# Patient Record
Sex: MALE | Race: WHITE | NOT HISPANIC OR LATINO | Employment: OTHER | URBAN - METROPOLITAN AREA
[De-identification: names, ages, dates, MRNs, and addresses within clinical notes are randomized per-mention and may not be internally consistent; named-entity substitution may affect disease eponyms.]

---

## 2022-10-11 ENCOUNTER — HOSPITAL ENCOUNTER (OUTPATIENT)
Facility: HOSPITAL | Age: 82
Setting detail: OBSERVATION
LOS: 1 days | Discharge: HOME HEALTH | End: 2022-10-12
Attending: EMERGENCY MEDICINE | Admitting: STUDENT IN AN ORGANIZED HEALTH CARE EDUCATION/TRAINING PROGRAM

## 2022-10-11 ENCOUNTER — APPOINTMENT (OUTPATIENT)
Dept: GENERAL RADIOLOGY | Facility: HOSPITAL | Age: 82
End: 2022-10-11

## 2022-10-11 ENCOUNTER — APPOINTMENT (OUTPATIENT)
Dept: CARDIOLOGY | Facility: HOSPITAL | Age: 82
End: 2022-10-11

## 2022-10-11 DIAGNOSIS — Z74.09 MOBILITY IMPAIRED: ICD-10-CM

## 2022-10-11 DIAGNOSIS — E16.2 HYPOGLYCEMIA: ICD-10-CM

## 2022-10-11 DIAGNOSIS — E08.69 DIABETES DUE TO UNDERLYING CONDITION W OTH COMPLICATION: ICD-10-CM

## 2022-10-11 DIAGNOSIS — R06.02 SHORTNESS OF BREATH: Primary | ICD-10-CM

## 2022-10-11 DIAGNOSIS — R77.8 ELEVATED TROPONIN: ICD-10-CM

## 2022-10-11 PROBLEM — N18.30 CKD (CHRONIC KIDNEY DISEASE) STAGE 3, GFR 30-59 ML/MIN: Status: ACTIVE | Noted: 2022-10-11

## 2022-10-11 PROBLEM — D69.6 THROMBOCYTOPENIA: Status: ACTIVE | Noted: 2022-10-11

## 2022-10-11 PROBLEM — D63.8 ANEMIA, CHRONIC DISEASE: Status: ACTIVE | Noted: 2022-10-11

## 2022-10-11 PROBLEM — N17.9 ACUTE KIDNEY INJURY: Status: ACTIVE | Noted: 2022-10-11

## 2022-10-11 LAB
ALBUMIN SERPL-MCNC: 4.2 G/DL (ref 3.5–5.2)
ALBUMIN/GLOB SERPL: 2.1 G/DL
ALP SERPL-CCNC: 98 U/L (ref 39–117)
ALT SERPL W P-5'-P-CCNC: 14 U/L (ref 1–41)
ANION GAP SERPL CALCULATED.3IONS-SCNC: 11.2 MMOL/L (ref 5–15)
ANION GAP SERPL CALCULATED.3IONS-SCNC: 7.7 MMOL/L (ref 5–15)
APTT PPP: 27.6 SECONDS (ref 22.7–35.4)
ASCENDING AORTA: 3.2 CM
AST SERPL-CCNC: 21 U/L (ref 1–40)
BASOPHILS # BLD AUTO: 0.04 10*3/MM3 (ref 0–0.2)
BASOPHILS NFR BLD AUTO: 0.5 % (ref 0–1.5)
BH CV ECHO MEAS - ACS: 1.92 CM
BH CV ECHO MEAS - AO MAX PG: 7.6 MMHG
BH CV ECHO MEAS - AO MEAN PG: 4.7 MMHG
BH CV ECHO MEAS - AO ROOT DIAM: 3.6 CM
BH CV ECHO MEAS - AO V2 MAX: 137.7 CM/SEC
BH CV ECHO MEAS - AO V2 VTI: 33 CM
BH CV ECHO MEAS - AVA(I,D): 2.43 CM2
BH CV ECHO MEAS - EDV(CUBED): 62.6 ML
BH CV ECHO MEAS - EDV(MOD-SP2): 74 ML
BH CV ECHO MEAS - EDV(MOD-SP4): 168 ML
BH CV ECHO MEAS - EF(MOD-BP): 55.3 %
BH CV ECHO MEAS - EF(MOD-SP2): 48.6 %
BH CV ECHO MEAS - EF(MOD-SP4): 58.9 %
BH CV ECHO MEAS - ESV(CUBED): 20.5 ML
BH CV ECHO MEAS - ESV(MOD-SP2): 38 ML
BH CV ECHO MEAS - ESV(MOD-SP4): 69 ML
BH CV ECHO MEAS - FS: 31.1 %
BH CV ECHO MEAS - IVS/LVPW: 0.93 CM
BH CV ECHO MEAS - IVSD: 1.07 CM
BH CV ECHO MEAS - LAT PEAK E' VEL: 9 CM/SEC
BH CV ECHO MEAS - LV DIASTOLIC VOL/BSA (35-75): 69 CM2
BH CV ECHO MEAS - LV MASS(C)D: 144.5 GRAMS
BH CV ECHO MEAS - LV MAX PG: 8.3 MMHG
BH CV ECHO MEAS - LV MEAN PG: 3.9 MMHG
BH CV ECHO MEAS - LV SYSTOLIC VOL/BSA (12-30): 28.3 CM2
BH CV ECHO MEAS - LV V1 MAX: 143.8 CM/SEC
BH CV ECHO MEAS - LV V1 VTI: 26.5 CM
BH CV ECHO MEAS - LVIDD: 4 CM
BH CV ECHO MEAS - LVIDS: 2.7 CM
BH CV ECHO MEAS - LVOT AREA: 3 CM2
BH CV ECHO MEAS - LVOT DIAM: 1.96 CM
BH CV ECHO MEAS - LVPWD: 1.14 CM
BH CV ECHO MEAS - MED PEAK E' VEL: 7.1 CM/SEC
BH CV ECHO MEAS - MV A DUR: 0.12 SEC
BH CV ECHO MEAS - MV A MAX VEL: 81.4 CM/SEC
BH CV ECHO MEAS - MV DEC SLOPE: 462.6 CM/SEC2
BH CV ECHO MEAS - MV DEC TIME: 0.18 MSEC
BH CV ECHO MEAS - MV E MAX VEL: 104 CM/SEC
BH CV ECHO MEAS - MV E/A: 1.28
BH CV ECHO MEAS - MV MAX PG: 5.7 MMHG
BH CV ECHO MEAS - MV MEAN PG: 2.6 MMHG
BH CV ECHO MEAS - MV P1/2T: 76.1 MSEC
BH CV ECHO MEAS - MV V2 VTI: 28.6 CM
BH CV ECHO MEAS - MVA(P1/2T): 2.9 CM2
BH CV ECHO MEAS - MVA(VTI): 2.8 CM2
BH CV ECHO MEAS - PA ACC TIME: 0.17 SEC
BH CV ECHO MEAS - PA PR(ACCEL): 2.9 MMHG
BH CV ECHO MEAS - PA V2 MAX: 131.7 CM/SEC
BH CV ECHO MEAS - PULM A REVS DUR: 0.09 SEC
BH CV ECHO MEAS - PULM A REVS VEL: 15.3 CM/SEC
BH CV ECHO MEAS - PULM DIAS VEL: 54 CM/SEC
BH CV ECHO MEAS - PULM S/D: 0.93
BH CV ECHO MEAS - PULM SYS VEL: 50.1 CM/SEC
BH CV ECHO MEAS - RAP SYSTOLE: 8 MMHG
BH CV ECHO MEAS - RV MAX PG: 8.3 MMHG
BH CV ECHO MEAS - RV V1 MAX: 143.8 CM/SEC
BH CV ECHO MEAS - RV V1 VTI: 26.6 CM
BH CV ECHO MEAS - RVSP: 34 MMHG
BH CV ECHO MEAS - SI(MOD-SP2): 14.8 ML/M2
BH CV ECHO MEAS - SI(MOD-SP4): 40.6 ML/M2
BH CV ECHO MEAS - SV(LVOT): 80.2 ML
BH CV ECHO MEAS - SV(MOD-SP2): 36 ML
BH CV ECHO MEAS - SV(MOD-SP4): 99 ML
BH CV ECHO MEAS - TAPSE (>1.6): 2.28 CM
BH CV ECHO MEAS - TR MAX PG: 26.2 MMHG
BH CV ECHO MEAS - TR MAX VEL: 255.8 CM/SEC
BH CV ECHO MEASUREMENTS AVERAGE E/E' RATIO: 12.92
BH CV XLRA - RV BASE: 4.6 CM
BH CV XLRA - RV LENGTH: 7.6 CM
BH CV XLRA - RV MID: 4.1 CM
BH CV XLRA - TDI S': 12.6 CM/SEC
BILIRUB SERPL-MCNC: 0.5 MG/DL (ref 0–1.2)
BUN SERPL-MCNC: 17 MG/DL (ref 8–23)
BUN SERPL-MCNC: 18 MG/DL (ref 8–23)
BUN/CREAT SERPL: 10.5 (ref 7–25)
BUN/CREAT SERPL: 11 (ref 7–25)
CALCIUM SPEC-SCNC: 8.9 MG/DL (ref 8.6–10.5)
CALCIUM SPEC-SCNC: 9.3 MG/DL (ref 8.6–10.5)
CHLORIDE SERPL-SCNC: 103 MMOL/L (ref 98–107)
CHLORIDE SERPL-SCNC: 104 MMOL/L (ref 98–107)
CO2 SERPL-SCNC: 24.8 MMOL/L (ref 22–29)
CO2 SERPL-SCNC: 30.3 MMOL/L (ref 22–29)
CREAT SERPL-MCNC: 1.62 MG/DL (ref 0.76–1.27)
CREAT SERPL-MCNC: 1.64 MG/DL (ref 0.76–1.27)
DEPRECATED RDW RBC AUTO: 44.5 FL (ref 37–54)
DEPRECATED RDW RBC AUTO: 47.6 FL (ref 37–54)
EGFRCR SERPLBLD CKD-EPI 2021: 41.2 ML/MIN/1.73
EGFRCR SERPLBLD CKD-EPI 2021: 41.9 ML/MIN/1.73
EOSINOPHIL # BLD AUTO: 0.13 10*3/MM3 (ref 0–0.4)
EOSINOPHIL NFR BLD AUTO: 1.5 % (ref 0.3–6.2)
ERYTHROCYTE [DISTWIDTH] IN BLOOD BY AUTOMATED COUNT: 13.5 % (ref 12.3–15.4)
ERYTHROCYTE [DISTWIDTH] IN BLOOD BY AUTOMATED COUNT: 13.8 % (ref 12.3–15.4)
GLOBULIN UR ELPH-MCNC: 2 GM/DL
GLUCOSE BLDC GLUCOMTR-MCNC: 129 MG/DL (ref 70–130)
GLUCOSE BLDC GLUCOMTR-MCNC: 148 MG/DL (ref 70–130)
GLUCOSE BLDC GLUCOMTR-MCNC: 174 MG/DL (ref 70–130)
GLUCOSE BLDC GLUCOMTR-MCNC: 191 MG/DL (ref 70–130)
GLUCOSE BLDC GLUCOMTR-MCNC: 200 MG/DL (ref 70–130)
GLUCOSE SERPL-MCNC: 155 MG/DL (ref 65–99)
GLUCOSE SERPL-MCNC: 44 MG/DL (ref 65–99)
HBA1C MFR BLD: 7.4 % (ref 4.8–5.6)
HCT VFR BLD AUTO: 35.8 % (ref 37.5–51)
HCT VFR BLD AUTO: 36.4 % (ref 37.5–51)
HGB BLD-MCNC: 11.8 G/DL (ref 13–17.7)
HGB BLD-MCNC: 12.3 G/DL (ref 13–17.7)
INR PPP: 1.09 (ref 0.9–1.1)
LEFT ATRIUM VOLUME INDEX: 26.2 ML/M2
LYMPHOCYTES # BLD AUTO: 1.27 10*3/MM3 (ref 0.7–3.1)
LYMPHOCYTES NFR BLD AUTO: 14.6 % (ref 19.6–45.3)
MAXIMAL PREDICTED HEART RATE: 137 BPM
MCH RBC QN AUTO: 30.3 PG (ref 26.6–33)
MCH RBC QN AUTO: 31 PG (ref 26.6–33)
MCHC RBC AUTO-ENTMCNC: 32.4 G/DL (ref 31.5–35.7)
MCHC RBC AUTO-ENTMCNC: 34.4 G/DL (ref 31.5–35.7)
MCV RBC AUTO: 90.2 FL (ref 79–97)
MCV RBC AUTO: 93.6 FL (ref 79–97)
MONOCYTES # BLD AUTO: 0.89 10*3/MM3 (ref 0.1–0.9)
MONOCYTES NFR BLD AUTO: 10.2 % (ref 5–12)
NEUTROPHILS NFR BLD AUTO: 6.31 10*3/MM3 (ref 1.7–7)
NEUTROPHILS NFR BLD AUTO: 72.6 % (ref 42.7–76)
NT-PROBNP SERPL-MCNC: 953 PG/ML (ref 0–1800)
PLATELET # BLD AUTO: 123 10*3/MM3 (ref 140–450)
PLATELET # BLD AUTO: 138 10*3/MM3 (ref 140–450)
PMV BLD AUTO: 10.9 FL (ref 6–12)
PMV BLD AUTO: 11.7 FL (ref 6–12)
POTASSIUM SERPL-SCNC: 3.8 MMOL/L (ref 3.5–5.2)
POTASSIUM SERPL-SCNC: 3.8 MMOL/L (ref 3.5–5.2)
PROT SERPL-MCNC: 6.2 G/DL (ref 6–8.5)
PROTHROMBIN TIME: 14.2 SECONDS (ref 11.7–14.2)
QT INTERVAL: 468 MS
RBC # BLD AUTO: 3.89 10*6/MM3 (ref 4.14–5.8)
RBC # BLD AUTO: 3.97 10*6/MM3 (ref 4.14–5.8)
SINUS: 3.3 CM
SODIUM SERPL-SCNC: 140 MMOL/L (ref 136–145)
SODIUM SERPL-SCNC: 141 MMOL/L (ref 136–145)
STJ: 3 CM
STRESS TARGET HR: 116 BPM
TROPONIN T SERPL-MCNC: 0.04 NG/ML (ref 0–0.03)
TROPONIN T SERPL-MCNC: 0.04 NG/ML (ref 0–0.03)
TROPONIN T SERPL-MCNC: 0.05 NG/ML (ref 0–0.03)
WBC NRBC COR # BLD: 5.19 10*3/MM3 (ref 3.4–10.8)
WBC NRBC COR # BLD: 8.69 10*3/MM3 (ref 3.4–10.8)

## 2022-10-11 PROCEDURE — 85025 COMPLETE CBC W/AUTO DIFF WBC: CPT | Performed by: EMERGENCY MEDICINE

## 2022-10-11 PROCEDURE — 85610 PROTHROMBIN TIME: CPT | Performed by: EMERGENCY MEDICINE

## 2022-10-11 PROCEDURE — G0378 HOSPITAL OBSERVATION PER HR: HCPCS

## 2022-10-11 PROCEDURE — 63710000001 INSULIN LISPRO (HUMAN) PER 5 UNITS: Performed by: NURSE PRACTITIONER

## 2022-10-11 PROCEDURE — 25010000002 PERFLUTREN (DEFINITY) 8.476 MG IN SODIUM CHLORIDE (PF) 0.9 % 10 ML INJECTION: Performed by: INTERNAL MEDICINE

## 2022-10-11 PROCEDURE — 97166 OT EVAL MOD COMPLEX 45 MIN: CPT

## 2022-10-11 PROCEDURE — 84484 ASSAY OF TROPONIN QUANT: CPT | Performed by: NURSE PRACTITIONER

## 2022-10-11 PROCEDURE — 97530 THERAPEUTIC ACTIVITIES: CPT

## 2022-10-11 PROCEDURE — 83880 ASSAY OF NATRIURETIC PEPTIDE: CPT | Performed by: EMERGENCY MEDICINE

## 2022-10-11 PROCEDURE — 99285 EMERGENCY DEPT VISIT HI MDM: CPT

## 2022-10-11 PROCEDURE — 80053 COMPREHEN METABOLIC PANEL: CPT | Performed by: EMERGENCY MEDICINE

## 2022-10-11 PROCEDURE — 99204 OFFICE O/P NEW MOD 45 MIN: CPT | Performed by: INTERNAL MEDICINE

## 2022-10-11 PROCEDURE — 85730 THROMBOPLASTIN TIME PARTIAL: CPT | Performed by: EMERGENCY MEDICINE

## 2022-10-11 PROCEDURE — 84484 ASSAY OF TROPONIN QUANT: CPT | Performed by: EMERGENCY MEDICINE

## 2022-10-11 PROCEDURE — 83036 HEMOGLOBIN GLYCOSYLATED A1C: CPT | Performed by: NURSE PRACTITIONER

## 2022-10-11 PROCEDURE — 93005 ELECTROCARDIOGRAM TRACING: CPT | Performed by: EMERGENCY MEDICINE

## 2022-10-11 PROCEDURE — 93306 TTE W/DOPPLER COMPLETE: CPT

## 2022-10-11 PROCEDURE — 93306 TTE W/DOPPLER COMPLETE: CPT | Performed by: INTERNAL MEDICINE

## 2022-10-11 PROCEDURE — 85027 COMPLETE CBC AUTOMATED: CPT | Performed by: NURSE PRACTITIONER

## 2022-10-11 PROCEDURE — 71045 X-RAY EXAM CHEST 1 VIEW: CPT

## 2022-10-11 PROCEDURE — 82962 GLUCOSE BLOOD TEST: CPT

## 2022-10-11 PROCEDURE — 97162 PT EVAL MOD COMPLEX 30 MIN: CPT

## 2022-10-11 RX ORDER — ACETAMINOPHEN 160 MG/5ML
650 SOLUTION ORAL EVERY 4 HOURS PRN
Status: DISCONTINUED | OUTPATIENT
Start: 2022-10-11 | End: 2022-10-12 | Stop reason: HOSPADM

## 2022-10-11 RX ORDER — POTASSIUM CHLORIDE 750 MG/1
40 TABLET, FILM COATED, EXTENDED RELEASE ORAL AS NEEDED
Status: DISCONTINUED | OUTPATIENT
Start: 2022-10-11 | End: 2022-10-12 | Stop reason: HOSPADM

## 2022-10-11 RX ORDER — SODIUM CHLORIDE 0.9 % (FLUSH) 0.9 %
10 SYRINGE (ML) INJECTION EVERY 12 HOURS SCHEDULED
Status: DISCONTINUED | OUTPATIENT
Start: 2022-10-11 | End: 2022-10-12 | Stop reason: HOSPADM

## 2022-10-11 RX ORDER — POTASSIUM CHLORIDE 7.45 MG/ML
10 INJECTION INTRAVENOUS
Status: DISCONTINUED | OUTPATIENT
Start: 2022-10-11 | End: 2022-10-12 | Stop reason: HOSPADM

## 2022-10-11 RX ORDER — POTASSIUM CHLORIDE 750 MG/1
10 TABLET, FILM COATED, EXTENDED RELEASE ORAL DAILY
COMMUNITY

## 2022-10-11 RX ORDER — FUROSEMIDE 40 MG/1
40 TABLET ORAL DAILY
Status: DISCONTINUED | OUTPATIENT
Start: 2022-10-12 | End: 2022-10-12 | Stop reason: HOSPADM

## 2022-10-11 RX ORDER — TAMSULOSIN HYDROCHLORIDE 0.4 MG/1
0.8 CAPSULE ORAL DAILY
Status: DISCONTINUED | OUTPATIENT
Start: 2022-10-11 | End: 2022-10-12 | Stop reason: HOSPADM

## 2022-10-11 RX ORDER — TAMSULOSIN HYDROCHLORIDE 0.4 MG/1
2 CAPSULE ORAL DAILY
COMMUNITY

## 2022-10-11 RX ORDER — ACETAMINOPHEN 325 MG/1
650 TABLET ORAL EVERY 4 HOURS PRN
Status: DISCONTINUED | OUTPATIENT
Start: 2022-10-11 | End: 2022-10-12 | Stop reason: HOSPADM

## 2022-10-11 RX ORDER — BUPROPION HYDROCHLORIDE 150 MG/1
150 TABLET, EXTENDED RELEASE ORAL 2 TIMES DAILY
COMMUNITY

## 2022-10-11 RX ORDER — MAGNESIUM SULFATE HEPTAHYDRATE 40 MG/ML
2 INJECTION, SOLUTION INTRAVENOUS AS NEEDED
Status: DISCONTINUED | OUTPATIENT
Start: 2022-10-11 | End: 2022-10-12 | Stop reason: HOSPADM

## 2022-10-11 RX ORDER — INSULIN LISPRO 100 [IU]/ML
0-7 INJECTION, SOLUTION INTRAVENOUS; SUBCUTANEOUS
Status: DISCONTINUED | OUTPATIENT
Start: 2022-10-11 | End: 2022-10-12 | Stop reason: HOSPADM

## 2022-10-11 RX ORDER — SODIUM CHLORIDE 0.9 % (FLUSH) 0.9 %
10 SYRINGE (ML) INJECTION AS NEEDED
Status: DISCONTINUED | OUTPATIENT
Start: 2022-10-11 | End: 2022-10-12 | Stop reason: HOSPADM

## 2022-10-11 RX ORDER — NITROGLYCERIN 0.4 MG/1
0.4 TABLET SUBLINGUAL
Status: DISCONTINUED | OUTPATIENT
Start: 2022-10-11 | End: 2022-10-12 | Stop reason: HOSPADM

## 2022-10-11 RX ORDER — FUROSEMIDE 40 MG/1
40 TABLET ORAL DAILY
COMMUNITY

## 2022-10-11 RX ORDER — CALCIUM CARBONATE 200(500)MG
2 TABLET,CHEWABLE ORAL 2 TIMES DAILY PRN
Status: DISCONTINUED | OUTPATIENT
Start: 2022-10-11 | End: 2022-10-12 | Stop reason: HOSPADM

## 2022-10-11 RX ORDER — ATORVASTATIN CALCIUM 20 MG/1
20 TABLET, FILM COATED ORAL DAILY
Status: DISCONTINUED | OUTPATIENT
Start: 2022-10-11 | End: 2022-10-12 | Stop reason: HOSPADM

## 2022-10-11 RX ORDER — ONDANSETRON 2 MG/ML
4 INJECTION INTRAMUSCULAR; INTRAVENOUS EVERY 6 HOURS PRN
Status: DISCONTINUED | OUTPATIENT
Start: 2022-10-11 | End: 2022-10-12 | Stop reason: HOSPADM

## 2022-10-11 RX ORDER — ASPIRIN 81 MG/1
81 TABLET, CHEWABLE ORAL DAILY
COMMUNITY

## 2022-10-11 RX ORDER — ATORVASTATIN CALCIUM 20 MG/1
20 TABLET, FILM COATED ORAL DAILY
COMMUNITY

## 2022-10-11 RX ORDER — POTASSIUM CHLORIDE 1.5 G/1.77G
40 POWDER, FOR SOLUTION ORAL AS NEEDED
Status: DISCONTINUED | OUTPATIENT
Start: 2022-10-11 | End: 2022-10-12 | Stop reason: HOSPADM

## 2022-10-11 RX ORDER — BUPROPION HYDROCHLORIDE 150 MG/1
150 TABLET, EXTENDED RELEASE ORAL 2 TIMES DAILY
Status: DISCONTINUED | OUTPATIENT
Start: 2022-10-11 | End: 2022-10-12 | Stop reason: HOSPADM

## 2022-10-11 RX ORDER — DEXTROSE MONOHYDRATE 25 G/50ML
25 INJECTION, SOLUTION INTRAVENOUS
Status: DISCONTINUED | OUTPATIENT
Start: 2022-10-11 | End: 2022-10-12 | Stop reason: HOSPADM

## 2022-10-11 RX ORDER — MAGNESIUM SULFATE HEPTAHYDRATE 40 MG/ML
4 INJECTION, SOLUTION INTRAVENOUS AS NEEDED
Status: DISCONTINUED | OUTPATIENT
Start: 2022-10-11 | End: 2022-10-12 | Stop reason: HOSPADM

## 2022-10-11 RX ORDER — IPRATROPIUM BROMIDE AND ALBUTEROL SULFATE 2.5; .5 MG/3ML; MG/3ML
3 SOLUTION RESPIRATORY (INHALATION) EVERY 4 HOURS PRN
Status: DISCONTINUED | OUTPATIENT
Start: 2022-10-11 | End: 2022-10-12 | Stop reason: HOSPADM

## 2022-10-11 RX ORDER — ASPIRIN 81 MG/1
81 TABLET ORAL DAILY
Status: DISCONTINUED | OUTPATIENT
Start: 2022-10-11 | End: 2022-10-12 | Stop reason: HOSPADM

## 2022-10-11 RX ORDER — NICOTINE POLACRILEX 4 MG
15 LOZENGE BUCCAL
Status: DISCONTINUED | OUTPATIENT
Start: 2022-10-11 | End: 2022-10-12 | Stop reason: HOSPADM

## 2022-10-11 RX ORDER — ONDANSETRON 4 MG/1
4 TABLET, FILM COATED ORAL EVERY 6 HOURS PRN
Status: DISCONTINUED | OUTPATIENT
Start: 2022-10-11 | End: 2022-10-12 | Stop reason: HOSPADM

## 2022-10-11 RX ADMIN — BUPROPION HYDROCHLORIDE 150 MG: 150 TABLET, EXTENDED RELEASE ORAL at 21:11

## 2022-10-11 RX ADMIN — ACETAMINOPHEN 650 MG: 325 TABLET, FILM COATED ORAL at 06:18

## 2022-10-11 RX ADMIN — INSULIN LISPRO 2 UNITS: 100 INJECTION, SOLUTION INTRAVENOUS; SUBCUTANEOUS at 12:21

## 2022-10-11 RX ADMIN — ATORVASTATIN CALCIUM 20 MG: 20 TABLET, FILM COATED ORAL at 18:17

## 2022-10-11 RX ADMIN — ASPIRIN 81 MG: 81 TABLET, FILM COATED ORAL at 11:11

## 2022-10-11 RX ADMIN — INSULIN LISPRO 3 UNITS: 100 INJECTION, SOLUTION INTRAVENOUS; SUBCUTANEOUS at 18:17

## 2022-10-11 RX ADMIN — ACETAMINOPHEN 650 MG: 325 TABLET, FILM COATED ORAL at 13:54

## 2022-10-11 RX ADMIN — TAMSULOSIN HYDROCHLORIDE 0.8 MG: 0.4 CAPSULE ORAL at 18:17

## 2022-10-11 RX ADMIN — PERFLUTREN 2 ML: 6.52 INJECTION, SUSPENSION INTRAVENOUS at 10:42

## 2022-10-11 RX ADMIN — Medication 10 ML: at 21:11

## 2022-10-11 RX ADMIN — Medication 10 ML: at 08:52

## 2022-10-11 NOTE — CONSULTS
Date of Consultation: 10/11/22    Referral Provider: Dr. Hong    Reason for Consultation: Shortness of breath, elevated troponin.    Encounter Provider: Manuel Mosquera MD    Group of Service: [facility]     Patient Name: Cheng Barros    :1940    Chief complaint: Shortness of breath, hypoglycemia.    History of Present Illness:      This is a very pleasant 81 year-old male with a history of diabetes, Hodgkin's lymphoma, and asthma.  He presented to the emergency department after having several episodes of hypoglycemia and feeling short of breath.  He states that he was not severely short of breath, although his family members encouraged him to come to the emergency department.  He has not had any chest pain or discomfort.  He does have chronic kidney disease, and his initial troponin in the emergency department was 0.041.  His glucose was noted to be low at 44, which has been treated.  His EKG showed a right bundle branch block, which is chronic.        Past Medical History:   Diagnosis Date   • Asthma    • CKD (chronic kidney disease)    • Diabetes (HCC)    • Lymphoma (HCC)          History reviewed. No pertinent surgical history.      No Known Allergies      No current facility-administered medications on file prior to encounter.     No current outpatient medications on file prior to encounter.         Social History     Socioeconomic History   • Marital status:    Tobacco Use   • Smoking status: Former     Packs/day: 1.00     Years: 40.00     Pack years: 40.00     Types: Cigarettes     Quit date: 10/1/1980     Years since quittin.0   • Tobacco comments:     Quit 40 years ago   Vaping Use   • Vaping Use: Never used   Substance and Sexual Activity   • Alcohol use: Not Currently     Comment: hasnt drank since 2019   • Drug use: Never         History reviewed. No pertinent family history.    REVIEW OF SYSTEMS:   Pertinent positives are noted in the HPI above.  Otherwise, all  "the systems were reviewed, and are negative.     Objective:     Vitals:    10/11/22 0401 10/11/22 0431 10/11/22 0532 10/11/22 0728   BP: 118/75 128/61 150/70 146/69   BP Location:   Right arm Right arm   Patient Position:   Lying Lying   Pulse: 76 70 79 78   Resp:   18 18   Temp:   98 °F (36.7 °C) 98.2 °F (36.8 °C)   TempSrc:   Oral Oral   SpO2: 99% 99% 100% 100%   Height:         There is no height or weight on file to calculate BMI.  Flowsheet Rows    Flowsheet Row First Filed Value   Admission Height 193 cm (76\") Documented at 10/11/2022 0036   Admission Weight --           General:    No acute distress, alert and oriented x4, pleasant                   Head:    Normocephalic, atraumatic.   Eyes:          Conjunctivae and sclerae normal, no icterus, PERRLA   Throat:   No oral lesions, no thrush, oral mucosa moist.    Neck:   Supple, trachea midline.   Lungs:     Clear to auscultation bilaterally     Heart:    Regular rhythm and normal rate.  No murmurs, gallops, or rubs noted.   Abdomen:     Soft, non-tender, non-distended, positive bowel sounds.    Extremities:   Trace edema with chronic skin changes on lower extremities.     Pulses:   Pulses palpable and equal bilaterally.    Skin:   No bleeding or rash.   Neuro:   Non-focal.  Moves all extremities well.    Psychiatric:   Normal mood and affect.                 Lab Review:                Results from last 7 days   Lab Units 10/11/22  0141   SODIUM mmol/L 141   POTASSIUM mmol/L 3.8   CHLORIDE mmol/L 103   CO2 mmol/L 30.3*   BUN mg/dL 18   CREATININE mg/dL 1.64*   GLUCOSE mg/dL 44*   CALCIUM mg/dL 8.9     Results from last 7 days   Lab Units 10/11/22  0141   TROPONIN T ng/mL 0.041*     Results from last 7 days   Lab Units 10/11/22  0141   WBC 10*3/mm3 8.69   HEMOGLOBIN g/dL 12.3*   HEMATOCRIT % 35.8*   PLATELETS 10*3/mm3 138*     Results from last 7 days   Lab Units 10/11/22  0141   INR  1.09   APTT seconds 27.6                   EKG (reviewed by me " personally):    EKG 10/11/22          Assessment:   1.  Hypoglycemia, treated  2.  Shortness of breath  3.  Elevated troponin of uncertain etiology currently  4.  Chronic kidney disease  5.  Right bundle branch block  6.  Hodgkin's lymphoma  7.  Asthma  8.  Thrombocytopenia    Plan:       His troponin level is elevated at 0.041.  However, he does have chronic kidney disease, and is unclear as to whether this is truly ischemic.  His symptoms were mild, and he did not have any chest discomfort.  He really only had shortness of breath and hypoglycemia on presentation.  His EKG shows a chronic right bundle branch block.  He is completely asymptomatic currently.    For now, I am going to check an echocardiogram to assess for any potential regional wall motion abnormalities.  His troponin will be trended to see if it increases.  I added aspirin 81 mg/day.  He has mild thrombocytopenia, although I doubt this will be an issue.  He does have Hodgkin's lymphoma and chronic kidney disease, and obviously conservative management would be preferred if at all possible.  Further plans will be made pending the echocardiogram.    Thank you very much for this consult.    Sae Mosquera MD

## 2022-10-11 NOTE — PLAN OF CARE
Goal Outcome Evaluation:  Plan of Care Reviewed With: patient           Outcome Evaluation: Pt is a 80 y/o M admitted to [facility] with SOA and hypoglycemia. Pt has a past med hx of Hodgkins Lymphoma, DM2, and asthma. Pt received in bed upon arrival and agreeable to PT/OT eval. Pt reports he lives with his son, daughter in-law, and grandson with a ramp to enter and sleeps on main level. Pt reports he has not ambulated in 20+ years and transfers to motorized scooter at baseline. Pt states he can complete transfer independently but sometimes requires assist fron son or grandon. Pt presents to PT with generalized weakness, decreased endurance, and impaired functional mobility. Pt required SBA with bed flat and w/o use of bed rails. Pt able to stand this visit requiring min A x 2 c HHA x 2. Pt has very poor standing balance as transfers or ambulation was deferred this visit. Pt would benefit from skilled PT to address stated deficits. D/C home safely with assist and HHPT when medically stable.

## 2022-10-11 NOTE — PLAN OF CARE
Goal Outcome Evaluation:  Plan of Care Reviewed With: patient        Progress: improving  Outcome Evaluation: VSS afebrile. Pt A&Ox3, reorient on day and time. Mild back pain, medicated with tylenol. Pending home meds from family. Port to LCW clean dry and intact, not accessed. Will recheck blood glucose. Call bell in reach.

## 2022-10-11 NOTE — CONSULTS
"Nutrition Services    Patient Name:  Cheng Barros  YOB: 1940  MRN: [id number]  Admit Date:  10/11/2022    Comment: Consult per RN admission screen    Pt is 82 yo male admitted with SOB, hypoglycemia and elevated troponin. Hx/o lymphoma. Reviewed weight history in Care Everywhere - 268 lb in Sept 2022, now 250 lb per today's weight. BMI 30.4. On regular/cardiac diet. Intake 50% at breakfast. Pt off unit at time of RD rounding - in cardiology for testing. RD to follow clinical course and intake.     CLINICAL NUTRITION ASSESSMENT      Reason for Assessment MST score 2+, Nurse Admission Screen, Nurse or Nurse Practitioner Consult     Diagnosis/Problem   SOB, elevated troponin, hypoglycemia   Medical/Surgical History History reviewed. No pertinent past medical history.    History reviewed. No pertinent surgical history.     Per Care Everywhere, pt with hx/o lymphoma, DM2, PVD, GERD, CKD, Bradycardia, peripheral neuropathy.      Encounter Information        Nutrition/Diet History:  Admitted thru ED with SOB. Lives at home with family helping to provide care. His wife passed in 2019. He has h/xo lymphoma. Reports some difficulty with chewing/swallowing. On regular/thins (cardiac). Hx diabetes, low BG upon admission. Pt unsure of his home meds per nursing notes. Rounded x 2 - CDE in with pt initially, then pt off the unit in cardiology for testing. Did not get to visit with him this date.    Food Preferences:    Supplements:    Factors Affecting Intake: altered respiratory status     Anthropometrics        Current Height  Current Weight  BMI kg/m2 Height: 193 cm (76\")   250 lb  BMI 30.4       Admission Weight 250 lb   Ideal Body Weight (IBW) 202 lb   Usual Body Weight (UBW) 268 lb 9/27/22 per Darrell's data in Care Everywhere   Weight Change/Trend Possible weight loss       Weight History Wt Readings from Last 30 Encounters:   No data found for Wt                 Tests/Procedures        Tests/Procedures CT " "scan, X-Ray wnl    CT abd/pelvis results:  1.Unchanged prominent axillary lymph nodes.   2.Interval increase in size of left external iliac and right inguinal lymphadenopathy.   3.Stable retroperitoneal lymphadenopathy.   4.Mild bladder wall thickening. Recommend correlation for cystitis.       Labs       Pertinent Labs    Results from last 7 days   Lab Units 10/11/22  0141   SODIUM mmol/L 141   POTASSIUM mmol/L 3.8   CHLORIDE mmol/L 103   CO2 mmol/L 30.3*   BUN mg/dL 18   CREATININE mg/dL 1.64*   CALCIUM mg/dL 8.9   BILIRUBIN mg/dL 0.5   ALK PHOS U/L 98   ALT (SGPT) U/L 14   AST (SGOT) U/L 21   GLUCOSE mg/dL 44*     Results from last 7 days   Lab Units 10/11/22  0141   HEMOGLOBIN g/dL 12.3*   HEMATOCRIT % 35.8*   WBC 10*3/mm3 8.69     Results from last 7 days   Lab Units 10/11/22  0141   INR  1.09   APTT seconds 27.6   PLATELETS 10*3/mm3 138*     No results found for: COVID19  Lab Results   Component Value Date    HGBA1C 8.4 (H) 08/26/2022          Medications           Scheduled Medications insulin lispro, 0-7 Units, Subcutaneous, TID AC  sodium chloride, 10 mL, Intravenous, Q12H       Infusions     PRN Medications •  acetaminophen **OR** acetaminophen **OR** acetaminophen  •  calcium carbonate  •  dextrose  •  dextrose  •  glucagon (human recombinant)  •  influenza vaccine  •  ipratropium-albuterol  •  nitroglycerin  •  ondansetron **OR** ondansetron  •  [COMPLETED] Insert peripheral IV **AND** sodium chloride  •  sodium chloride     Physical Findings        Physical Appearance alert, hard of hearing     NFPE Not applicable   --  Edema  no edema   Gastrointestinal last bowel movement: utd   Tubes/Drains none   Oral/Mouth Cavity WNL   Skin skin intact   --  Estimated/Assessed Needs       Energy Requirements    Height for Calculation  Height: 193 cm (76\")   Weight for Calculation 113 kg   Method for Estimation  22 kcal/kg   EST Needs (kcal/day) 2486       Protein Requirements    Weight for Calculation 113 kg   EST " Protein Needs (g/kg) 1.0 gm/kg   EST Daily Needs (g/day) 113        Fluid Requirements     Method for Estimation 1 mL/kcal    Estimated Needs (mL/day) 2486       Fluid Deficit    Current Na Level (mEq/L) 141   Desired Na Level (mEq/L)    Estimated Fluid Deficit (L)             Current Nutrition Orders & Evaluation of Intake       Oral Nutrition     Food Allergies NKFA   Current PO Diet Diet Regular; Cardiac   Supplement n/a   PO Evaluation     Trending % PO Intake 50% breakkfast       --  PES STATEMENT / NUTRITION DIAGNOSIS      Nutrition Dx Problem  Problem: Predicted Suboptimal Intake  Etiology: Factors Affecting Nutrition  Signs/Symptoms: Report/Observation    Comment: possible weight loss, decreased appetite indicated on nurse admission screen   --  NUTRITION INTERVENTION      Intervention Goal(s) Reduce/improve symptoms, Disease management/therapy, Maintain weight and PO intake goal %: 85         RD Intervention/Action Encourage intake, Follow Tx Progress and Care plan reviewed         Prescription/Orders:       PO Diet       Supplements       Enteral Nutrition       Parenteral Nutrition    New Prescription Ordered?    --      Monitor/Evaluation Per protocol   Education Will instruct as appropriate   --    RD to follow per protocol.      Electronically signed by:  Lizeth Church RD  10/11/22 08:23 EDT

## 2022-10-11 NOTE — CONSULTS
spoke with patient about Advance Directive.  left Advance Directive with patient for patient to fill out.

## 2022-10-11 NOTE — NURSING NOTE
VSS pt total assist from ER gurney to bed. Pt on telemetry cardiac monitoring, room air, and continuous pulse oximetry. Pt does not know home medications and states he will call family to bring list in. A&Ox3, pt very talkative and likes to speak of his wife who passed away in 2019. Pt states he has many family members at home to care for him. SR up x2, call bell in reach. Will continue to monitor.

## 2022-10-11 NOTE — PLAN OF CARE
Goal Outcome Evaluation:  Plan of Care Reviewed With: patient           Outcome Evaluation: Pt seen for OT/PT eval this AM. Pt admitted with SOB and hypoglycemia. PMHx includes Hodgkins Lymphoma, DM2, and asthma. He reports living at home with his son, daughter in law and grandson that are very supportive. He has a ramp to enter the home and bed/bath on main level. He reports he transfers to a motorized scooter or wc with assistance from his son/grandson and at times completes independently. Reports transferring to toilet and shower chair with assistance from son/grandson. Pt demonstrated good bed mobility requiring SBA to transition from supine to sit and return to supine. Sat with good static sitting balance EOB. Stood with min Ax2 and demostrated poor static standing balance. Pt presents with decreased strength, balance, ADL performance, functional mobility and endurance. pt to benefit from skilled OT to address deficits and maximize independence with ADLs. Anticipate dc home with family assistance.

## 2022-10-11 NOTE — NURSING NOTE
Son, Candido Barros, will bring in medication list before this afternoon. Will update PTA med list when list available.

## 2022-10-11 NOTE — H&P
Patient Name:  Cheng Barros  YOB: 1940  MRN:  [id number]  Admit Date:  10/11/2022  Patient Care Team:  Provider, No Known as PCP - General      Subjective   History Present Illness     Chief Complaint   Patient presents with   • Shortness of Breath       Mr. Barros is a 81 y.o. former smoker with a history of asthma, type 2 diabetes mellitus, lymphoma, CKD that presents to [facility] complaining of shortness of breath.    Lives with family & answering questions appropriately.  Took insulin for glucose 179 despite low appetite / intake & developed dizziness & blurred vision; therefore, family advised hospital admission.    Troponin 0.04 trend flat (asymptomatic).  Glucose 44 improved to 148.  States shortness of breath resolved following use of albuterol inhaler.    Recommendation pending hospital course.  Details below.    History of Present Illness  Review of Systems   Constitutional: Negative for chills and fever.   HENT: Negative for congestion and rhinorrhea.    Eyes: Positive for visual disturbance (resolved).   Respiratory: Positive for shortness of breath (resolved). Negative for cough.    Cardiovascular: Negative for chest pain and leg swelling.   Gastrointestinal: Negative for abdominal pain, constipation, diarrhea, nausea and vomiting.   Endocrine: Negative for polydipsia, polyphagia and polyuria.   Genitourinary: Negative for difficulty urinating and dysuria.   Musculoskeletal: Positive for gait problem (baseline motorized scooter). Negative for myalgias.   Skin: Negative for rash and wound.   Neurological: Positive for dizziness (resolved). Negative for syncope.   Psychiatric/Behavioral: Negative for confusion and hallucinations.        Personal History     Past Medical History:   Diagnosis Date   • Asthma    • CKD (chronic kidney disease)    • Diabetes (HCC)    • Lymphoma (HCC)      History reviewed. No pertinent surgical history.  History reviewed. No pertinent family  history.  Social History     Tobacco Use   • Smoking status: Former     Packs/day: 1.00     Years: 40.00     Pack years: 40.00     Types: Cigarettes     Quit date: 10/1/1980     Years since quittin.0   • Tobacco comments:     Quit 40 years ago   Vaping Use   • Vaping Use: Never used   Substance Use Topics   • Alcohol use: Not Currently     Comment: hasnt drank since 2019   • Drug use: Never     No current facility-administered medications on file prior to encounter.     No current outpatient medications on file prior to encounter.     No Known Allergies    Objective    Objective     Vital Signs  Temp:  [97.5 °F (36.4 °C)-98.2 °F (36.8 °C)] 98 °F (36.7 °C)  Heart Rate:  [56-79] 75  Resp:  [18-19] 18  BP: (118-165)/(52-93) 138/72  SpO2:  [95 %-100 %] 100 %  on   ;   Device (Oxygen Therapy): room air  Body mass index is 30.43 kg/m².    Physical Exam  Constitutional:       General: He is not in acute distress.     Appearance: He is not toxic-appearing.      Comments: Elderly, generally weak   HENT:      Head: Normocephalic and atraumatic.   Eyes:      Extraocular Movements: Extraocular movements intact.      Conjunctiva/sclera: Conjunctivae normal.   Cardiovascular:      Rate and Rhythm: Normal rate.   Pulmonary:      Effort: Pulmonary effort is normal.      Comments: Diminished on expiration throughout lung fields  Abdominal:      General: Bowel sounds are normal.      Palpations: Abdomen is soft.   Musculoskeletal:         General: No tenderness.      Cervical back: Normal range of motion and neck supple.      Right lower leg: Edema (Dependent, nonpitting edema BLE) present.      Left lower leg: Edema present.   Skin:     General: Skin is warm and dry.   Neurological:      Mental Status: He is alert and oriented to person, place, and time.      Cranial Nerves: No cranial nerve deficit.   Psychiatric:         Behavior: Behavior normal.         Thought Content: Thought content normal.         Results Review:  I  reviewed the patient's new clinical results.  I reviewed the patient's new imaging results and agree with the interpretation.  I reviewed the patient's other test results and agree with the interpretation  I personally viewed and interpreted the patient's EKG/Telemetry data  Discussed with ED provider.    Lab Results (last 24 hours)     Procedure Component Value Units Date/Time    CBC & Differential [[id number]]  (Abnormal) Collected: 10/11/22 0141    Specimen: Blood Updated: 10/11/22 0323    Narrative:      The following orders were created for panel order CBC & Differential.  Procedure                               Abnormality         Status                     ---------                               -----------         ------                     CBC Auto Differential[[id number]]        Abnormal            Final result                 Please view results for these tests on the individual orders.    Comprehensive Metabolic Panel [[id number]]  (Abnormal) Collected: 10/11/22 0141    Specimen: Blood Updated: 10/11/22 0253     Glucose 44 mg/dL      BUN 18 mg/dL      Creatinine 1.64 mg/dL      Sodium 141 mmol/L      Potassium 3.8 mmol/L      Chloride 103 mmol/L      CO2 30.3 mmol/L      Calcium 8.9 mg/dL      Total Protein 6.2 g/dL      Albumin 4.20 g/dL      ALT (SGPT) 14 U/L      AST (SGOT) 21 U/L      Alkaline Phosphatase 98 U/L      Total Bilirubin 0.5 mg/dL      Globulin 2.0 gm/dL      A/G Ratio 2.1 g/dL      BUN/Creatinine Ratio 11.0     Anion Gap 7.7 mmol/L      eGFR 41.2 mL/min/1.73      Comment: National Kidney Foundation and American Society of Nephrology (ASN) Task Force recommended calculation based on the Chronic Kidney Disease Epidemiology Collaboration (CKD-EPI) equation refit without adjustment for race.       Narrative:      GFR Normal >60  Chronic Kidney Disease <60  Kidney Failure <15      Protime-INR [[id number]]  (Normal) Collected: 10/11/22 0141    Specimen: Blood Updated: 10/11/22 0232     Protime  14.2 Seconds      INR 1.09    aPTT [[id number]]  (Normal) Collected: 10/11/22 0141    Specimen: Blood Updated: 10/11/22 0232     PTT 27.6 seconds     BNP [[id number]]  (Normal) Collected: 10/11/22 0141    Specimen: Blood Updated: 10/11/22 0211     proBNP 953.0 pg/mL     Narrative:      Among patients with dyspnea, NT-proBNP is highly sensitive for the detection of acute congestive heart failure. In addition NT-proBNP of <300 pg/ml effectively rules out acute congestive heart failure with 99% negative predictive value.    Results may be falsely decreased if patient taking Biotin.      Troponin [[id number]]  (Abnormal) Collected: 10/11/22 0141    Specimen: Blood Updated: 10/11/22 0254     Troponin T 0.041 ng/mL     Narrative:      Troponin T Reference Range:  <= 0.03 ng/mL-   Negative for AMI  >0.03 ng/mL-     Abnormal for myocardial necrosis.  Clinicians would have to utilize clinical acumen, EKG, Troponin and serial changes to determine if it is an Acute Myocardial Infarction or myocardial injury due to an underlying chronic condition.       Results may be falsely decreased if patient taking Biotin.      CBC Auto Differential [[id number]]  (Abnormal) Collected: 10/11/22 0141    Specimen: Blood Updated: 10/11/22 0323     WBC 8.69 10*3/mm3      RBC 3.97 10*6/mm3      Hemoglobin 12.3 g/dL      Hematocrit 35.8 %      MCV 90.2 fL      MCH 31.0 pg      MCHC 34.4 g/dL      RDW 13.5 %      RDW-SD 44.5 fl      MPV 10.9 fL      Platelets 138 10*3/mm3      Neutrophil % 72.6 %      Lymphocyte % 14.6 %      Monocyte % 10.2 %      Eosinophil % 1.5 %      Basophil % 0.5 %      Neutrophils, Absolute 6.31 10*3/mm3      Lymphocytes, Absolute 1.27 10*3/mm3      Monocytes, Absolute 0.89 10*3/mm3      Eosinophils, Absolute 0.13 10*3/mm3      Basophils, Absolute 0.04 10*3/mm3     POC Glucose Once [[id number]]  (Abnormal) Collected: 10/11/22 0509    Specimen: Blood Updated: 10/11/22 0511     Glucose 148 mg/dL      Comment: Meter: [id number]  : [id number] Herlinda Ny RN       POC Glucose Once [[id number]]  (Normal) Collected: 10/11/22 0611    Specimen: Blood Updated: 10/11/22 0612     Glucose 129 mg/dL      Comment: Meter: [id number] : [id number] Rakan Morgan MILLICENT       CBC (No Diff) [[id number]]  (Abnormal) Collected: 10/11/22 0926    Specimen: Blood Updated: 10/11/22 1046     WBC 5.19 10*3/mm3      RBC 3.89 10*6/mm3      Hemoglobin 11.8 g/dL      Hematocrit 36.4 %      MCV 93.6 fL      MCH 30.3 pg      MCHC 32.4 g/dL      RDW 13.8 %      RDW-SD 47.6 fl      MPV 11.7 fL      Platelets 123 10*3/mm3     Hemoglobin A1c [[id number]] Collected: 10/11/22 0926    Specimen: Blood Updated: 10/11/22 1027    Troponin [[id number]]  (Abnormal) Collected: 10/11/22 0930    Specimen: Blood Updated: 10/11/22 1109     Troponin T 0.046 ng/mL     Narrative:      Troponin T Reference Range:  <= 0.03 ng/mL-   Negative for AMI  >0.03 ng/mL-     Abnormal for myocardial necrosis.  Clinicians would have to utilize clinical acumen, EKG, Troponin and serial changes to determine if it is an Acute Myocardial Infarction or myocardial injury due to an underlying chronic condition.       Results may be falsely decreased if patient taking Biotin.      Basic Metabolic Panel [[id number]]  (Abnormal) Collected: 10/11/22 0930    Specimen: Blood Updated: 10/11/22 1104     Glucose 155 mg/dL      BUN 17 mg/dL      Creatinine 1.62 mg/dL      Sodium 140 mmol/L      Potassium 3.8 mmol/L      Chloride 104 mmol/L      CO2 24.8 mmol/L      Calcium 9.3 mg/dL      BUN/Creatinine Ratio 10.5     Anion Gap 11.2 mmol/L      eGFR 41.9 mL/min/1.73      Comment: National Kidney Foundation and American Society of Nephrology (ASN) Task Force recommended calculation based on the Chronic Kidney Disease Epidemiology Collaboration (CKD-EPI) equation refit without adjustment for race.       Narrative:      GFR Normal >60  Chronic Kidney Disease <60  Kidney Failure <15      POC Glucose Once [[id number]]   (Abnormal) Collected: 10/11/22 1155    Specimen: Blood Updated: 10/11/22 1157     Glucose 174 mg/dL      Comment: Meter: [id number] : [id number] Saad CERDA       Troponin [[id number]]  (Abnormal) Collected: 10/11/22 1203    Specimen: Blood Updated: 10/11/22 1252     Troponin T 0.043 ng/mL     Narrative:      Troponin T Reference Range:  <= 0.03 ng/mL-   Negative for AMI  >0.03 ng/mL-     Abnormal for myocardial necrosis.  Clinicians would have to utilize clinical acumen, EKG, Troponin and serial changes to determine if it is an Acute Myocardial Infarction or myocardial injury due to an underlying chronic condition.       Results may be falsely decreased if patient taking Biotin.            Imaging Results (Last 24 Hours)     Procedure Component Value Units Date/Time    XR Chest 1 View [[id number]] Collected: 10/11/22 0218     Updated: 10/11/22 0218    Narrative:        Patient: DANNIELLE CHAVES  Time Out: 02:17  Exam(s): FILM CXR 1 VIEW     EXAM:    XR Chest, 1 View    CLINICAL HISTORY:     Reason for exam: soa.    TECHNIQUE:    Frontal view of the chest.    COMPARISON:    No relevant prior studies available.    FINDINGS:    Lungs:  No consolidation.  No overt edema.    Pleural space:  No pleural effusion. No pneumothorax.    Heart:  Unremarkable.  No cardiomegaly.    Tubes, lines and devices:  Port-A-Cath in place.    Upper abdomen:  Elevated left hemidiaphragm.    IMPRESSION:         Elevated left hemidiaphragm.  Otherwise no radiographically evident   acute abnormality.      Impression:          Electronically signed by Chacho Aguilar MD on 10-11-22 at 0217          Results for orders placed during the hospital encounter of 10/11/22    Adult Transthoracic Echo Complete w/ Color, Spectral and Contrast if Necessary Per Protocol    Interpretation Summary  •  Calculated left ventricular EF = 55.3% Estimated left ventricular EF was in agreement with the calculated left ventricular EF. Left ventricular systolic  function is normal.  •  Left ventricular diastolic function was normal.  •  No significant valvular stenosis or regurgitation noted.  •  No evidence of pulmonary hypertension is present.      ECG 12 Lead   Preliminary Result   HEART RATE= 60  bpm   RR Interval= 1000  ms   KY Interval=   ms   P Horizontal Axis=   deg   P Front Axis=   deg   QRSD Interval= 173  ms   QT Interval= 468  ms   QRS Axis= 48  deg   T Wave Axis= 14  deg   - ABNORMAL ECG -   Junctional rhythm   Right bundle branch block   Electronically Signed By:    Date and Time of Study: 2022-10-11 00:56:06      SCANNED - TELEMETRY     Final Result           Assessment/Plan     Active Hospital Problems    Diagnosis  POA   • **Shortness of breath [R06.02]  Yes   • Hypoglycemia [E16.2]  Yes   • Thrombocytopenia (HCC) [D69.6]  Yes   • Anemia, chronic disease [D63.8]  Yes   • Elevated troponin [R77.8]  Yes   • CKD (chronic kidney disease) stage 3, GFR 30-59 ml/min (HCC) [N18.30]  Yes      Resolved Hospital Problems   No resolved problems to display.       Mr. Barros is a 81 y.o. former smoker with a history of asthma, type 2 diabetes mellitus, lymphoma, CKD that presents to [facility] complaining of shortness of breath and admitted for shortness of breath and incidental elevated troponin.      Shortness of breath: O2 saturation 100% on room air.  Elevated left hemidiaphragm on CXR.  Suspect secondary to history of asthma and complicated by hypoglycemia.  proBNP 950.  Continue bronchodilators as needed.      Hypoglycemia:  DM RN following to re-educate appropriate use of insulin & glucose monitoring with possible CGM device and glucagon kit as needed.       Elevated troponin:  Denies chest pain. Echo ordered.  Cardiology following.  ASA 81 mg PO daily added.       CKD (chronic kidney disease) stage 3, GFR 30-59 ml/min ([facility]): Serum creatinine 1.6 increased from 1.2.        Thrombocytopenia (HCC): Chronic and stable with trend.      Anemia, chronic  disease: Mostly stable hemoglobin trend.  No overt signs of active bleeding.  Continue ordering iron profile, ferritin, folate, vitamin B12.    I discussed the patient's findings and my recommendations with patient, RN, & Dr. Hong.     *Possible discharge home with family on 10/12/2022    VTE Prophylaxis - SCD  Code Status - CPR       ARLETH Escamilla  [facility]  10/11/22  14:46 EDT

## 2022-10-11 NOTE — ED PROVIDER NOTES
EMERGENCY DEPARTMENT ENCOUNTER    CHIEF COMPLAINT  Chief Complaint: Shortness of breath  History given by: Patient  History limited by: None  Room Number: 09/09  PMD: Provider, No Known      HPI:  Pt is a 81 y.o. male who presents complaining of shortness of breath that occurred while at home just prior to ED arrival today.  He reports that at the onset of shortness of breath he did use a albuterol inhaler with resolution of his symptoms.  He denied any cough, chest pain, fever/chills, or nausea and vomiting.  Currently, he denies physical complaint.    Duration: Just prior to ED arrival  Onset: Sudden  Location: Cardio/pulmonary  Radiation: None  Quality: Shortness of breath  Intensity/Severity: Moderate  Progression: Currently resolved  Associated Symptoms: None  Aggravating Factors: None  Alleviating Factors: None  Previous Episodes: None  Treatment before arrival: Albuterol    PAST MEDICAL HISTORY  Active Ambulatory Problems     Diagnosis Date Noted   • No Active Ambulatory Problems     Resolved Ambulatory Problems     Diagnosis Date Noted   • No Resolved Ambulatory Problems     No Additional Past Medical History       PAST SURGICAL HISTORY  History reviewed. No pertinent surgical history.    FAMILY HISTORY  History reviewed. No pertinent family history.    SOCIAL HISTORY  Social History     Socioeconomic History   • Marital status:        ALLERGIES  Patient has no known allergies.    REVIEW OF SYSTEMS  Review of Systems   Constitutional: Negative for activity change, appetite change and fever.   HENT: Negative for congestion and sore throat.    Eyes: Negative.    Respiratory: Positive for shortness of breath. Negative for cough.    Cardiovascular: Negative for chest pain and leg swelling.   Gastrointestinal: Negative for abdominal pain, diarrhea and vomiting.   Endocrine: Negative.    Genitourinary: Negative for decreased urine volume and dysuria.   Musculoskeletal: Negative for neck pain.   Skin:  Negative for rash and wound.   Allergic/Immunologic: Negative.    Neurological: Negative for weakness, numbness and headaches.   Hematological: Negative.    Psychiatric/Behavioral: Negative.    All other systems reviewed and are negative.      PHYSICAL EXAM  ED Triage Vitals [10/11/22 0036]   Temp Heart Rate Resp BP SpO2   97.5 °F (36.4 °C) 65 19 165/60 97 %      Temp src Heart Rate Source Patient Position BP Location FiO2 (%)   Oral -- -- -- --       Physical Exam  Vitals and nursing note reviewed.   Constitutional:       General: He is not in acute distress.  HENT:      Head: Normocephalic and atraumatic.   Eyes:      Extraocular Movements: EOM normal.      Pupils: Pupils are equal, round, and reactive to light.   Cardiovascular:      Rate and Rhythm: Normal rate and regular rhythm.      Heart sounds: Normal heart sounds.   Pulmonary:      Effort: Pulmonary effort is normal. No respiratory distress.      Breath sounds: Normal breath sounds.   Abdominal:      Palpations: Abdomen is soft.      Tenderness: There is no abdominal tenderness. There is no guarding or rebound.   Musculoskeletal:         General: Normal range of motion.      Cervical back: Normal range of motion and neck supple.      Right lower leg: No tenderness. Edema present.      Left lower leg: No tenderness. Edema present.   Skin:     General: Skin is warm and dry.   Neurological:      Mental Status: He is alert and oriented to person, place, and time.      Sensory: Sensation is intact.      Motor: Motor strength is normal.   Psychiatric:         Mood and Affect: Mood and affect normal.         LAB RESULTS  Lab Results (last 24 hours)     Procedure Component Value Units Date/Time    CBC & Differential [[id number]]  (Abnormal) Collected: 10/11/22 0141    Specimen: Blood Updated: 10/11/22 7409    Narrative:      The following orders were created for panel order CBC & Differential.  Procedure                               Abnormality         Status                      ---------                               -----------         ------                     CBC Auto Differential[[id number]]        Abnormal            Final result                 Please view results for these tests on the individual orders.    Comprehensive Metabolic Panel [[id number]]  (Abnormal) Collected: 10/11/22 0141    Specimen: Blood Updated: 10/11/22 0253     Glucose 44 mg/dL      BUN 18 mg/dL      Creatinine 1.64 mg/dL      Sodium 141 mmol/L      Potassium 3.8 mmol/L      Chloride 103 mmol/L      CO2 30.3 mmol/L      Calcium 8.9 mg/dL      Total Protein 6.2 g/dL      Albumin 4.20 g/dL      ALT (SGPT) 14 U/L      AST (SGOT) 21 U/L      Alkaline Phosphatase 98 U/L      Total Bilirubin 0.5 mg/dL      Globulin 2.0 gm/dL      A/G Ratio 2.1 g/dL      BUN/Creatinine Ratio 11.0     Anion Gap 7.7 mmol/L      eGFR 41.2 mL/min/1.73      Comment: National Kidney Foundation and American Society of Nephrology (ASN) Task Force recommended calculation based on the Chronic Kidney Disease Epidemiology Collaboration (CKD-EPI) equation refit without adjustment for race.       Narrative:      GFR Normal >60  Chronic Kidney Disease <60  Kidney Failure <15      Protime-INR [[id number]]  (Normal) Collected: 10/11/22 0141    Specimen: Blood Updated: 10/11/22 0232     Protime 14.2 Seconds      INR 1.09    aPTT [[id number]]  (Normal) Collected: 10/11/22 0141    Specimen: Blood Updated: 10/11/22 0232     PTT 27.6 seconds     BNP [[id number]]  (Normal) Collected: 10/11/22 0141    Specimen: Blood Updated: 10/11/22 0211     proBNP 953.0 pg/mL     Narrative:      Among patients with dyspnea, NT-proBNP is highly sensitive for the detection of acute congestive heart failure. In addition NT-proBNP of <300 pg/ml effectively rules out acute congestive heart failure with 99% negative predictive value.    Results may be falsely decreased if patient taking Biotin.      Troponin [[id number]]  (Abnormal) Collected: 10/11/22 0141    Specimen:  Blood Updated: 10/11/22 0254     Troponin T 0.041 ng/mL     Narrative:      Troponin T Reference Range:  <= 0.03 ng/mL-   Negative for AMI  >0.03 ng/mL-     Abnormal for myocardial necrosis.  Clinicians would have to utilize clinical acumen, EKG, Troponin and serial changes to determine if it is an Acute Myocardial Infarction or myocardial injury due to an underlying chronic condition.       Results may be falsely decreased if patient taking Biotin.      CBC Auto Differential [[id number]]  (Abnormal) Collected: 10/11/22 0141    Specimen: Blood Updated: 10/11/22 0323     WBC 8.69 10*3/mm3      RBC 3.97 10*6/mm3      Hemoglobin 12.3 g/dL      Hematocrit 35.8 %      MCV 90.2 fL      MCH 31.0 pg      MCHC 34.4 g/dL      RDW 13.5 %      RDW-SD 44.5 fl      MPV 10.9 fL      Platelets 138 10*3/mm3      Neutrophil % 72.6 %      Lymphocyte % 14.6 %      Monocyte % 10.2 %      Eosinophil % 1.5 %      Basophil % 0.5 %      Neutrophils, Absolute 6.31 10*3/mm3      Lymphocytes, Absolute 1.27 10*3/mm3      Monocytes, Absolute 0.89 10*3/mm3      Eosinophils, Absolute 0.13 10*3/mm3      Basophils, Absolute 0.04 10*3/mm3           I ordered the above labs and reviewed the results    RADIOLOGY  XR Chest 1 View   Final Result         Electronically signed by Chacho Aguilar MD on 10-11-22 at 0217           I ordered the above noted radiological studies. Interpreted by radiologist.  Reviewed by me in PACS.       PROCEDURES  Procedures  EKG          EKG time: 0056  Rhythm/Rate: NSR, 60  P waves and OK: nml  QRS, axis: widened, RBBB, LAD   ST and T waves: nml     Interpreted Contemporaneously by me, independently viewed  No previous EKG available for comparison      PROGRESS AND CONSULTS  ED Course as of 10/11/22 0428   Tue Oct 11, 2022   0342 On reevaluation, the patient is resting comfortably and continues to be asymptomatic.  I did inform him that given his low blood glucose as well as elevated troponin, we would admit him to the  hospital for further management and treatment.  The patient is in agreement with that plan today and all questions have been answered. [BM]   0425 Case discussed with ARLETH Craft for [facility], who agrees to admit the patient to the hospital for Dr. Noyola. [BM]      ED Course User Index  [BM] Yoni Bledsoe MD     The patient was wearing a facemask upon entrance into the room and remained in such throughout their visit.  I was wearing PPE including a facemask, eye protection, as well as gloves at any point entering the room and throughout the visit      MEDICAL DECISION MAKING  Results were reviewed/discussed with the patient and they were also made aware of online access. Pt also made aware that some labs, such as cultures, will not be resulted during ER visit and follow up with PMD is necessary.     MDM  Number of Diagnoses or Management Options     Amount and/or Complexity of Data Reviewed  Clinical lab tests: ordered and reviewed  Tests in the radiology section of CPT®: ordered and reviewed  Tests in the medicine section of CPT®: ordered and reviewed  Review and summarize past medical records: yes (Upon medical records review, the patient was last seen and evaluated on 9/29/2022 secondary to hypoglycemia)  Discuss the patient with other providers: yes (ARLETH Craft for [facility], who will admit the patient for Dr. Noyola)  Independent visualization of images, tracings, or specimens: yes (Chest x-ray showing an elevated left hemidiaphragm without acute abnormality)           DIAGNOSIS  Final diagnoses:   Shortness of breath   Elevated troponin   Hypoglycemia       DISPOSITION  ADMISSION    Discussed treatment plan and reason for admission with pt/family and admitting physician.  Pt/family voiced understanding of the plan for admission for further testing/treatment as needed.         Latest Documented Vital Signs:  As of 04:28 EDT  BP- 118/75 HR- 76 Temp- 97.5 °F (36.4 °C) (Oral) O2 sat- 99%          Yoni Bledsoe MD  10/11/22 0423

## 2022-10-11 NOTE — ED TRIAGE NOTES
Patient arrived to ED via EMS from home. Pt c/o SOA to family. Family was concerned so called EMS. Patient currently denies any SOA. Pt BG was 64. EMS administered oral Glucose 15g. However, pt did not swallow it. EMS states he just let it sit in his mouth. Pt denies any complaints at this time. Hx of Hodgkins Lymphoma, DM2, Asthma.

## 2022-10-11 NOTE — CONSULTS
"Diabetes Education  Assessment/Teaching    Patient Name:  Cheng Barros  YOB: 1940  MRN: [id number]  Admit Date:  10/11/2022      Assessment Date:  10/11/2022  Flowsheet Row Most Recent Value   General Information     Referral From: A1c, Database   Height 193 cm (76\")   Height Method Stated   Weight 113 kg (250 lb)   Are you currently involved in an activity/exercise program?  No   Pregnancy Assessment    Diabetes History    What type of diabetes do you have? Type 2   Length of Diabetes Diagnosis 10 + years   Current DM knowledge good   Have you had diabetes education/teaching in the past? no   Do you test your blood sugar at home? yes   Frequency of checks couple times day   Have you had low blood sugar? (<70mg/dl) yes   How often do you have low blood sugar? occasionally   Have you had high blood sugar? (>140mg/dl) yes   How often do you have high blood sugar? occasionally   How would you rate your diabetes control? good   Education Preferences    What areas of diabetes would you like to learn about? avoiding low blood sugar, medications for diabetes, testing my blood sugar at home   Nutrition Information    Assessment Topics    Taking Medication - Assessment Needs education   Problem Solving - Assessment Needs education   Monitoring - Assessment Needs education   DM Goals    Taking Medication - Goal 0-7 days from discharge   Problem Solving - Goal 0-7 days from discharge   Monitoring - Goal 0-7 days from discharge          Flowsheet Row Most Recent Value   DM Education Needs    Meter Has own   Frequency of Testing 2 times a day   Medication Insulin  [70/30 45U BID]   Problem Solving Hypoglycemia, Signs, Symptoms, Treatment   Healthy Coping Appropriate   Discharge Plan Home, Follow-up with MD   Motivation Moderate   Teaching Method Explanation, Discussion, Teach back, Handouts   Patient Response Verbalized understanding, Needs reinforcement            Other Comments:  Discussed with pt his diabetes " "management.He states he tests a couple times day.He states he had a recent low and that has landed him in the hospital. He states he was not feeling well and told his son( who he lives with) and they called the EMS and that his BG was very low (pt could not remember what numbers was)    Discussed low Bg treatment with pt. Also discussed looking into a CGM device and have his son and  grandson assist with it. Also discussed was a Glucogon kit for the family to use. Written information given to pt. Also pt gave permission for me to talk to son,so will see son here later or call on the phone.Pt to review all this with his PCP,Dr Hou.   Pt states he takes 70/30 insulin BIB ,which he takes on \"as need\" basis. We discussed taking this more safely also.      Electronically signed by:  Eunice Gale RN  10/11/22 11:26 EDT  "

## 2022-10-11 NOTE — THERAPY EVALUATION
Patient Name: Cheng Barros  : 1940    MRN: [id number]                              Today's Date: 10/11/2022       Admit Date: 10/11/2022    Visit Dx:     ICD-10-CM ICD-9-CM   1. Shortness of breath  R06.02 786.05   2. Elevated troponin  R77.8 790.6   3. Hypoglycemia  E16.2 251.2     Patient Active Problem List   Diagnosis   • Shortness of breath   • Acute kidney injury (HCC)   • Hypoglycemia   • Thrombocytopenia (HCC)   • Anemia, chronic disease   • Elevated troponin   • CKD (chronic kidney disease) stage 3, GFR 30-59 ml/min (HCC)     Past Medical History:   Diagnosis Date   • Asthma    • CKD (chronic kidney disease)    • Diabetes (HCC)    • Lymphoma (HCC)      History reviewed. No pertinent surgical history.   General Information     Row Name 10/11/22 151          Physical Therapy Time and Intention    Document Type evaluation  -CS     Mode of Treatment co-treatment;physical therapy;occupational therapy  -CS     Row Name 10/11/22 151          General Information    Patient Profile Reviewed yes  -CS     Prior Level of Function independent:;min assist:;transfer;w/c or scooter;bed mobility  pt reports he transfers to motorized scooter or wc (I) at baseline; pt reports son/grandson sometimes assist if needed  -CS     Existing Precautions/Restrictions fall  -CS     Barriers to Rehab medically complex;previous functional deficit  -CS     Row Name 10/11/22 151          Living Environment    People in Home child(jerman), adult;grandchild(jerman)  lives with son, daughter in-law, and grandson  -CS     Row Name 10/11/22 151          Home Main Entrance    Number of Stairs, Main Entrance other (see comments)  ramp  -CS     Row Name 10/11/22 1517          Stairs Within Home, Primary    Number of Stairs, Within Home, Primary none  -CS     Row Name 10/11/22 151          Cognition    Orientation Status (Cognition) oriented x 3  -CS     Row Name 10/11/22 151          Safety Issues, Functional Mobility    Impairments Affecting  Function (Mobility) balance;strength;endurance/activity tolerance;pain  -CS           User Key  (r) = Recorded By, (t) = Taken By, (c) = Cosigned By    Initials Name Provider Type    Latisha Spear, NUSRAT Physical Therapist               Mobility     Row Name 10/11/22 1519          Bed Mobility    Bed Mobility supine-sit;sit-supine  -CS     Supine-Sit Bulan (Bed Mobility) standby assist  -CS     Sit-Supine Bulan (Bed Mobility) standby assist  -CS     Comment, (Bed Mobility) bed flat and no use of bed rails  -CS     Row Name 10/11/22 1519          Bed-Chair Transfer    Bed-Chair Bulan (Transfers) unable to assess  -     Row Name 10/11/22 1519          Sit-Stand Transfer    Sit-Stand Bulan (Transfers) minimum assist (75% patient effort);2 person assist  -CS     Assistive Device (Sit-Stand Transfers) other (see comments)  HHA x 2  -CS     Row Name 10/11/22 1519          Gait/Stairs (Locomotion)    Bulan Level (Gait) unable to assess  -CS           User Key  (r) = Recorded By, (t) = Taken By, (c) = Cosigned By    Initials Name Provider Type    Latisha Spear, PT Physical Therapist               Obj/Interventions     Row Name 10/11/22 1519          Range of Motion Comprehensive    General Range of Motion bilateral lower extremity ROM WFL  -     Row Name 10/11/22 1519          Strength Comprehensive (MMT)    Comment, General Manual Muscle Testing (MMT) Assessment B LE grossly 4-/5  -CS     Row Name 10/11/22 1519          Motor Skills    Therapeutic Exercise other (see comments)  encouraged/provided HEP  -     Row Name 10/11/22 1519          Balance    Balance Assessment sitting static balance;sitting dynamic balance;standing static balance  -CS     Static Sitting Balance standby assist  -CS     Dynamic Sitting Balance standby assist;contact guard  -CS     Position, Sitting Balance unsupported;sitting edge of bed  -CS     Static Standing Balance minimal assist;2-person assist   -CS     Position/Device Used, Standing Balance supported  -CS     Comment, Balance pt has poor static standing balance; only able to stand roughly 30 seconds  -CS           User Key  (r) = Recorded By, (t) = Taken By, (c) = Cosigned By    Initials Name Provider Type    CS Latisha Kuo PT Physical Therapist               Goals/Plan     Row Name 10/11/22 1526          Bed Mobility Goal 1 (PT)    Activity/Assistive Device (Bed Mobility Goal 1, PT) bed mobility activities, all  -CS     Ames Level/Cues Needed (Bed Mobility Goal 1, PT) modified independence  -CS     Time Frame (Bed Mobility Goal 1, PT) 1 week  -CS     Row Name 10/11/22 1523          Transfer Goal 1 (PT)    Activity/Assistive Device (Transfer Goal 1, PT) sit-to-stand/stand-to-sit;bed-to-chair/chair-to-bed  -CS     Ames Level/Cues Needed (Transfer Goal 1, PT) minimum assist (75% or more patient effort)  -CS     Time Frame (Transfer Goal 1, PT) 1 week  -CS           User Key  (r) = Recorded By, (t) = Taken By, (c) = Cosigned By    Initials Name Provider Type    CS Latisha Kuo, PT Physical Therapist               Clinical Impression     Row Name 10/11/22 1526          Pain    Pretreatment Pain Rating 10/10  -CS     Posttreatment Pain Rating 10/10  -CS     Pain Location lower  -CS     Pain Location - back  -CS     Pain Intervention(s) Rest;Repositioned;Other (Comment)  notified nsg for pain medication  -CS     Row Name 10/11/22 8642          Plan of Care Review    Plan of Care Reviewed With patient  -CS     Outcome Evaluation Pt is a 80 y/o M admitted [facility] with SOA and hypoglycemia. Pt has a past med hx of Hodgkins Lymphoma, DM2, and asthma. Pt received in bed upon arrival and agreeable to PT/OT eval. Pt reports he lives with his son, daughter in-law, and grandson with a ramp to enter and sleeps on main level. Pt reports he has not ambulated in 20+ years and transfers to motorized scooter at baseline. Pt states he can complete  transfer independently but sometimes requires assist fron son or grandon. Pt presents to PT with generalized weakness, decreased endurance, and impaired functional mobility. Pt required SBA with bed flat and w/o use of bed rails. Pt able to stand this visit requiring min A x 2 c HHA x 2. Pt has very poor standing balance as transfers or ambulation was deferred this visit. Pt would benefit from skilled PT to address stated deficits. D/C home safely with assist and HHPT when medically stable.  -CS     Row Name 10/11/22 1520          Therapy Assessment/Plan (PT)    Patient/Family Therapy Goals Statement (PT) to return home  -CS     Rehab Potential (PT) good, to achieve stated therapy goals  -CS     Criteria for Skilled Interventions Met (PT) yes;meets criteria  -CS     Therapy Frequency (PT) 5 times/wk  -CS     Row Name 10/11/22 1520          Positioning and Restraints    Pre-Treatment Position in bed  -CS     Post Treatment Position bed  -CS     In Bed notified nsg;supine;call light within reach;encouraged to call for assist;exit alarm on;heels elevated  -CS           User Key  (r) = Recorded By, (t) = Taken By, (c) = Cosigned By    Initials Name Provider Type    CS Latisha Kuo, PT Physical Therapist               Outcome Measures     Row Name 10/11/22 1526          How much help from another person do you currently need...    Turning from your back to your side while in flat bed without using bedrails? 4  -CS     Moving from lying on back to sitting on the side of a flat bed without bedrails? 4  -CS     Moving to and from a bed to a chair (including a wheelchair)? 2  -CS     Standing up from a chair using your arms (e.g., wheelchair, bedside chair)? 2  -CS     Climbing 3-5 steps with a railing? 1  -CS     To walk in hospital room? 1  -CS     AM-PAC 6 Clicks Score (PT) 14  -CS     Highest level of mobility 4 --> Transferred to chair/commode  -CS     Row Name 10/11/22 1526 10/11/22 1500       Functional Assessment     Outcome Measure Options AM-PAC 6 Clicks Basic Mobility (PT)  - AM-PAC 6 Clicks Daily Activity (OT)  -RACH          User Key  (r) = Recorded By, (t) = Taken By, (c) = Cosigned By    Initials Name Provider Type    Cheyenne Odell OT Occupational Therapist    Latisha Spear, NUSRAT Physical Therapist                             Physical Therapy Education     Title: PT OT SLP Therapies (Done)     Topic: Physical Therapy (Done)     Point: Mobility training (Done)     Learning Progress Summary           Patient Acceptance, E,TB, VU,DU by  at 10/11/2022 1526                   Point: Home exercise program (Done)     Learning Progress Summary           Patient Acceptance, E,TB, VU,DU by  at 10/11/2022 1526                   Point: Body mechanics (Done)     Learning Progress Summary           Patient Acceptance, E,TB, VU,DU by  at 10/11/2022 1526                   Point: Precautions (Done)     Learning Progress Summary           Patient Acceptance, E,TB, VU,DU by  at 10/11/2022 1526                               User Key     Initials Effective Dates Name Provider Type Discipline     09/22/22 -  Latisha Kuo, NUSRAT Physical Therapist PT              PT Recommendation and Plan     Plan of Care Reviewed With: patient  Outcome Evaluation: Pt is a 82 y/o M admitted to [facility] with SOA and hypoglycemia. Pt has a past med hx of Hodgkins Lymphoma, DM2, and asthma. Pt received in bed upon arrival and agreeable to PT/OT eval. Pt reports he lives with his son, daughter in-law, and grandson with a ramp to enter and sleeps on main level. Pt reports he has not ambulated in 20+ years and transfers to motorized scooter at baseline. Pt states he can complete transfer independently but sometimes requires assist fron son or grandon. Pt presents to PT with generalized weakness, decreased endurance, and impaired functional mobility. Pt required SBA with bed flat and w/o use of bed rails. Pt able to stand this visit requiring min  A x 2 c HHA x 2. Pt has very poor standing balance as transfers or ambulation was deferred this visit. Pt would benefit from skilled PT to address stated deficits. D/C home safely with assist and HHPT when medically stable.     Time Calculation:    PT Charges     Row Name 10/11/22 1527             Time Calculation    Start Time 1325  -CS      Stop Time 1347  -CS      Time Calculation (min) 22 min  -CS      PT Received On 10/11/22  -CS      PT - Next Appointment 10/12/22  -CS      PT Goal Re-Cert Due Date 10/18/22  -CS         Time Calculation- PT    Total Timed Code Minutes- PT 20 minute(s)  -CS         Timed Charges    58450 - PT Therapeutic Activity Minutes 20  -CS         Total Minutes    Timed Charges Total Minutes 20  -CS       Total Minutes 20  -CS            User Key  (r) = Recorded By, (t) = Taken By, (c) = Cosigned By    Initials Name Provider Type    CS Latisha Kuo, PT Physical Therapist              Therapy Charges for Today     Code Description Service Date Service Provider Modifiers Qty    15373713834 HC PT THERAPEUTIC ACT EA 15 MIN 10/11/2022 Latisha Kuo, PT GP 1    [id number]  PT EVAL MOD COMPLEXITY 3 10/11/2022 Latisha Kuo, PT GP 1          PT G-Codes  Outcome Measure Options: AM-PAC 6 Clicks Basic Mobility (PT)  AM-PAC 6 Clicks Score (PT): 14  AM-PAC 6 Clicks Score (OT): 15    Latisha NUSRAT Kuo  10/11/2022

## 2022-10-11 NOTE — ED NOTES
Nursing report ED to floor  Cheng Barros  81 y.o.  male    HPI :   Chief Complaint   Patient presents with    Shortness of Breath       Admitting doctor:   Kevon Noyola MD    Admitting diagnosis:   The primary encounter diagnosis was Shortness of breath. Diagnoses of Elevated troponin and Hypoglycemia were also pertinent to this visit.    Code status:   Current Code Status       Date Active Code Status Order ID Comments User Context       Not on file            Allergies:   Patient has no known allergies.    Isolation:   No active isolations    Intake and Output  No intake or output data in the 24 hours ending 10/11/22 0435    Weight:   There were no vitals filed for this visit.    Most recent vitals:   Vitals:    10/11/22 0301 10/11/22 0331 10/11/22 0332 10/11/22 0401   BP: 120/69 118/52  118/75   Pulse: 64  71 76   Resp:       Temp:       TempSrc:       SpO2: 100%  95% 99%   Height:           Active LDAs/IV Access:   Lines, Drains & Airways       Active LDAs       Name Placement date Placement time Site Days    Peripheral IV 10/11/22 0141 Right Antecubital 10/11/22  0141  Antecubital  less than 1                    Labs (abnormal labs have a star):   Labs Reviewed   COMPREHENSIVE METABOLIC PANEL - Abnormal; Notable for the following components:       Result Value    Glucose 44 (*)     Creatinine 1.64 (*)     CO2 30.3 (*)     eGFR 41.2 (*)     All other components within normal limits    Narrative:     GFR Normal >60  Chronic Kidney Disease <60  Kidney Failure <15     TROPONIN (IN-HOUSE) - Abnormal; Notable for the following components:    Troponin T 0.041 (*)     All other components within normal limits    Narrative:     Troponin T Reference Range:  <= 0.03 ng/mL-   Negative for AMI  >0.03 ng/mL-     Abnormal for myocardial necrosis.  Clinicians would have to utilize clinical acumen, EKG, Troponin and serial changes to determine if it is an Acute Myocardial Infarction or myocardial injury due to an underlying  chronic condition.       Results may be falsely decreased if patient taking Biotin.     CBC WITH AUTO DIFFERENTIAL - Abnormal; Notable for the following components:    RBC 3.97 (*)     Hemoglobin 12.3 (*)     Hematocrit 35.8 (*)     Platelets 138 (*)     Lymphocyte % 14.6 (*)     All other components within normal limits   PROTIME-INR - Normal   APTT - Normal   BNP (IN-HOUSE) - Normal    Narrative:     Among patients with dyspnea, NT-proBNP is highly sensitive for the detection of acute congestive heart failure. In addition NT-proBNP of <300 pg/ml effectively rules out acute congestive heart failure with 99% negative predictive value.    Results may be falsely decreased if patient taking Biotin.     CBC AND DIFFERENTIAL    Narrative:     The following orders were created for panel order CBC & Differential.  Procedure                               Abnormality         Status                     ---------                               -----------         ------                     CBC Auto Differential[[id number]]        Abnormal            Final result                 Please view results for these tests on the individual orders.       EKG:   ECG 12 Lead   Preliminary Result   HEART RATE= 60  bpm   RR Interval= 1000  ms   TX Interval=   ms   P Horizontal Axis=   deg   P Front Axis=   deg   QRSD Interval= 173  ms   QT Interval= 468  ms   QRS Axis= 48  deg   T Wave Axis= 14  deg   - ABNORMAL ECG -   Junctional rhythm   Right bundle branch block   Electronically Signed By:    Date and Time of Study: 2022-10-11 00:56:06          Meds given in ED:   Medications   sodium chloride 0.9 % flush 10 mL (has no administration in time range)       Imaging results:  XR Chest 1 View    Result Date: 10/11/2022  Electronically signed by Chacho Aguilar MD on 10-11-22 at 0217     Ambulatory status:   Up with assistance    Social issues:   Social History     Socioeconomic History    Marital status:        NIH Stroke Scale:          Yanely Pate RN  10/11/22 04:35 EDT

## 2022-10-11 NOTE — THERAPY EVALUATION
Patient Name: Cheng Barros  : 1940    MRN: [id number]                              Today's Date: 10/11/2022       Admit Date: 10/11/2022    Visit Dx:     ICD-10-CM ICD-9-CM   1. Shortness of breath  R06.02 786.05   2. Elevated troponin  R77.8 790.6   3. Hypoglycemia  E16.2 251.2     Patient Active Problem List   Diagnosis   • Shortness of breath   • Acute kidney injury (HCC)   • Hypoglycemia   • Thrombocytopenia (HCC)   • Anemia, chronic disease   • Elevated troponin   • CKD (chronic kidney disease) stage 3, GFR 30-59 ml/min (HCC)     Past Medical History:   Diagnosis Date   • Asthma    • CKD (chronic kidney disease)    • Diabetes (HCC)    • Lymphoma (HCC)      History reviewed. No pertinent surgical history.   General Information     Row Name 10/11/22 144          OT Time and Intention    Document Type evaluation  -     Mode of Treatment individual therapy;occupational therapy  -     Row Name 10/11/22 144          General Information    Patient Profile Reviewed yes  -KA     Prior Level of Function independent:;ADL's  reports being indep with ADLs and son/grandson assist him at times to transfer into  or motorized scooter  -     Existing Precautions/Restrictions fall  -     Row Name 10/11/22 144          Living Environment    People in Home child(jerman), adult;grandchild(jerman)  lives with his son, daughter in law, and grandson  -     Row Name 10/11/22 144          Home Main Entrance    Number of Stairs, Main Entrance --  Ramp to enter  -     Row Name 10/11/22 1441          Stairs Within Home, Primary    Stairs, Within Home, Primary bedroom and bathroom on main level  -     Row Name 10/11/22 144          Cognition    Orientation Status (Cognition) oriented x 3  -     Row Name 10/11/22 144          Safety Issues, Functional Mobility    Impairments Affecting Function (Mobility) balance;strength;endurance/activity tolerance;pain  -     Comment, Safety Issues/Impairments (Mobility) nonskid  socks and gait belt donned  -           User Key  (r) = Recorded By, (t) = Taken By, (c) = Cosigned By    Initials Name Provider Type    Cheyenne Odell OT Occupational Therapist                 Mobility/ADL's     Row Name 10/11/22 1443          Bed Mobility    Bed Mobility supine-sit;sit-supine  -     Supine-Sit [place] (Bed Mobility) standby assist  -     Comment, (Bed Mobility) SBA with bed flat  -     Row Name 10/11/22 1443          Transfers    Transfers sit-stand transfer  -     Row Name 10/11/22 1443          Sit-Stand Transfer    Sit-Stand Cassia (Transfers) minimum assist (75% patient effort);2 person assist  -     Assistive Device (Sit-Stand Transfers) --  HHAx2  -     Row Name 10/11/22 1443          Functional Mobility    Functional Mobility- Ind. Level not tested  -     Row Name 10/11/22 1443          Activities of Daily Living    BADL Assessment/Intervention lower body dressing;grooming;toileting  -     Row Name 10/11/22 1443          Lower Body Dressing Assessment/Training    [place] Level (Lower Body Dressing) lower body dressing skills;don;doff;socks;maximum assist (25% patient effort)  -     Comment, (Lower Body Dressing) Reports using a reacher to A with donning socks. pt reported takes him increased time. Educated pt on benefits of sock aide to assist to promote independence  -     Row Name 10/11/22 1443          Grooming Assessment/Training    Comment, (Grooming) Pt declined grooming task this PM. BUE WFL anticipate set up/SBA to complete  -     Row Name 10/11/22 1443          Toileting Assessment/Training    [place] Level (Toileting) toileting skills;maximum assist (25% patient effort);perform perineal hygiene;change pad/brief  -           User Key  (r) = Recorded By, (t) = Taken By, (c) = Cosigned By    Initials Name Provider Type    Cheyenne Odell OT Occupational Therapist               Obj/Interventions     Row Name 10/11/22 1442           Range of Motion Comprehensive    General Range of Motion bilateral upper extremity ROM WFL  -KA     Row Name 10/11/22 1445          Strength Comprehensive (MMT)    Comment, General Manual Muscle Testing (MMT) Assessment BUE grossly 4-/5  -KA     Row Name 10/11/22 1445          Balance    Balance Assessment sitting static balance;sitting dynamic balance  -KA     Static Sitting Balance standby assist  -KA     Dynamic Sitting Balance contact guard;standby assist  -KA     Balance Interventions sitting;standing  -KA     Comment, Balance Pt with poor static standing balance EOB  -KA           User Key  (r) = Recorded By, (t) = Taken By, (c) = Cosigned By    Initials Name Provider Type    Cheyenne Odell, OT Occupational Therapist               Goals/Plan     Row Name 10/11/22 1459          Bathing Goal 1 (OT)    Activity/Device (Bathing Goal 1, OT) bathing skills, all;long-handled sponge  -KA     [place] Level/Cues Needed (Bathing Goal 1, OT) standby assist  -KA     Progress/Outcomes (Bathing Goal 1, OT) goal ongoing  -     Row Name 10/11/22 1459          Dressing Goal 1 (OT)    Activity/Device (Dressing Goal 1, OT) dressing skills, all;sock-aid  -KA     [place]/Cues Needed (Dressing Goal 1, OT) standby assist  -KA     Progress/Outcome (Dressing Goal 1, OT) goal ongoing  -     Row Name 10/11/22 1459          Toileting Goal 1 (OT)    Activity/Device (Toileting Goal 1, OT) toileting skills, all;commode, bedside with drop arms  -KA     [place] Level/Cues Needed (Toileting Goal 1, OT) minimum assist (75% or more patient effort)  -KA     Progress/Outcome (Toileting Goal 1, OT) goal ongoing  -     Row Name 10/11/22 1459          Grooming Goal 1 (OT)    Activity/Device (Grooming Goal 1, OT) grooming skills, all  -KA     [place] (Grooming Goal 1, OT) standby assist  -KA     Progress/Outcome (Grooming Goal 1, OT) goal ongoing  -[facility] Name 10/11/22 1459          Therapy Assessment/Plan (OT)     Planned Therapy Interventions (OT) activity tolerance training;functional balance retraining;occupation/activity based interventions;ROM/therapeutic exercise;strengthening exercise;transfer/mobility retraining;patient/caregiver education/training;BADL retraining  -           User Key  (r) = Recorded By, (t) = Taken By, (c) = Cosigned By    Initials Name Provider Type    Cheyenne Odell, SANCHEZ Occupational Therapist               Clinical Impression     Row Name 10/11/22 1446          Pain Assessment    Pretreatment Pain Rating 10/10  -     Posttreatment Pain Rating 10/10  -     Pre/Posttreatment Pain Comment reported 10/10 pain in back  -     Row Name 10/11/22 1446          Plan of Care Review    Plan of Care Reviewed With patient  -     Outcome Evaluation Pt seen for OT/PT eval this AM. Pt admitted with SOB and hypoglycemia. PMHx includes Hodgkins Lymphoma, DM2, and asthma. He reports living at home with his son, daughter in law and grandson that are very supportive. He has a ramp to enter the home and bed/bath on main level. He reports he transfers to a motorized scooter or wc with assistance from his son/grandson and at times completes independently. Reports transferring to toilet and shower chair with assistance from son/grandson. Pt demonstrated good bed mobility requiring SBA to transition from supine to sit and return to supine. Sat with good static sitting balance EOB. Stood with min Ax2 and demostrated poor static standing balance. Pt presents with decreased strength, balance, ADL performance, functional mobility and endurance. pt to benefit from skilled OT to address deficits and maximize independence with ADLs. Anticipate dc home with family assistance.  -     Row Name 10/11/22 1446          Therapy Assessment/Plan (OT)    Therapy Frequency (OT) 3 times/wk  -     Row Name 10/11/22 1446          Therapy Plan Review/Discharge Plan (OT)    Anticipated Discharge Disposition (OT) home with  assist;home with home health  -     Row Name 10/11/22 1446          Vital Signs    Pre Patient Position Supine  -KA     Intra Patient Position Standing  -KA     Post Patient Position Supine  -KA     Row Name 10/11/22 1446          Positioning and Restraints    Pre-Treatment Position in bed  -KA     Post Treatment Position bed  -KA     In Bed notified nsg;supine;call light within reach;encouraged to call for assist;exit alarm on  -KA           User Key  (r) = Recorded By, (t) = Taken By, (c) = Cosigned By    Initials Name Provider Type    Cheyenne Odell OT Occupational Therapist               Outcome Measures     Row Name 10/11/22 1500          How much help from another is currently needed...    Putting on and taking off regular lower body clothing? 2  -KA     Bathing (including washing, rinsing, and drying) 2  -KA     Toileting (which includes using toilet bed pan or urinal) 2  -KA     Putting on and taking off regular upper body clothing 3  -KA     Taking care of personal grooming (such as brushing teeth) 3  -KA     Eating meals 3  -KA     AM-PAC 6 Clicks Score (OT) 15  -KA     Row Name 10/11/22 1500          Functional Assessment    Outcome Measure Options AM-PAC 6 Clicks Daily Activity (OT)  -KA           User Key  (r) = Recorded By, (t) = Taken By, (c) = Cosigned By    Initials Name Provider Type    Cheyenne Odell OT Occupational Therapist                Occupational Therapy Education     Title: PT OT SLP Therapies (Done)     Topic: Occupational Therapy (Done)     Point: ADL training (Done)     Description:   Instruct learner(s) on proper safety adaptation and remediation techniques during self care or transfers.   Instruct in proper use of assistive devices.              Learning Progress Summary           Patient Acceptance, E,D, VU by RACH at 10/11/2022 1500                               User Key     Initials Effective Dates Name Provider Type [facility] 09/22/22 -  Cheyenne Plascencia OT  Occupational Therapist OT              OT Recommendation and Plan  Planned Therapy Interventions (OT): activity tolerance training, functional balance retraining, occupation/activity based interventions, ROM/therapeutic exercise, strengthening exercise, transfer/mobility retraining, patient/caregiver education/training, BADL retraining  Therapy Frequency (OT): 3 times/wk  Plan of Care Review  Plan of Care Reviewed With: patient  Outcome Evaluation: Pt seen for OT/PT eval this AM. Pt admitted with SOB and hypoglycemia. PMHx includes Hodgkins Lymphoma, DM2, and asthma. He reports living at home with his son, daughter in law and grandson that are very supportive. He has a ramp to enter the home and bed/bath on main level. He reports he transfers to a motorized scooter or wc with assistance from his son/grandson and at times completes independently. Reports transferring to toilet and shower chair with assistance from son/grandson. Pt demonstrated good bed mobility requiring SBA to transition from supine to sit and return to supine. Sat with good static sitting balance EOB. Stood with min Ax2 and demostrated poor static standing balance. Pt presents with decreased strength, balance, ADL performance, functional mobility and endurance. pt to benefit from skilled OT to address deficits and maximize independence with ADLs. Anticipate dc home with family assistance.     Time Calculation:    Time Calculation- OT     Row Name 10/11/22 1501             Time Calculation- OT    OT Start Time 1325  -KA      OT Stop Time 1347  -KA      OT Time Calculation (min) 22 min  -KA      Total Timed Code Minutes- OT 8 minute(s)  -KA      OT Received On 10/11/22  -KA      OT - Next Appointment 10/13/22  -KA      OT Goal Re-Cert Due Date 10/25/22  -KA         Timed Charges    95511 - OT Therapeutic Activity Minutes 8  -KA         Untimed Charges    OT Eval/Re-eval Minutes 14  -KA         Total Minutes    Timed Charges Total Minutes 8  -KA       Untimed Charges Total Minutes 14  -KA       Total Minutes 22  -KA            User Key  (r) = Recorded By, (t) = Taken By, (c) = Cosigned By    Initials Name Provider Type    Cheyenne Odell OT Occupational Therapist              Therapy Charges for Today     Code Description Service Date Service Provider Modifiers Qty    [id number]  OT THERAPEUTIC ACT EA 15 MIN 10/11/2022 Cheyenne Plascencia OT GO 1    [id number]  OT EVAL MOD COMPLEXITY 2 10/11/2022 Cheyenne Plascencia OT GO 1               Cheyenne Plascencia OT  10/11/2022

## 2022-10-11 NOTE — PLAN OF CARE
Goal Outcome Evaluation:    Progress: improving  Outcome Evaluation: Vitals stable. Echo completed this am - EF = 55.3%. Trop increased to 0.046 - trending down, latest trop - 0.043. Med rec completed and home medications ordered. BG stable today - range 129 - 200. Son at bedside this evening. Patient stable and needs met at this time.

## 2022-10-12 ENCOUNTER — HOME HEALTH ADMISSION (OUTPATIENT)
Dept: HOME HEALTH SERVICES | Facility: HOME HEALTHCARE | Age: 82
End: 2022-10-12

## 2022-10-12 VITALS
BODY MASS INDEX: 30.44 KG/M2 | RESPIRATION RATE: 18 BRPM | OXYGEN SATURATION: 97 % | TEMPERATURE: 97.4 F | HEIGHT: 76 IN | SYSTOLIC BLOOD PRESSURE: 148 MMHG | HEART RATE: 70 BPM | DIASTOLIC BLOOD PRESSURE: 74 MMHG | WEIGHT: 250 LBS

## 2022-10-12 LAB
ANION GAP SERPL CALCULATED.3IONS-SCNC: 6.3 MMOL/L (ref 5–15)
BASOPHILS # BLD AUTO: 0.03 10*3/MM3 (ref 0–0.2)
BASOPHILS NFR BLD AUTO: 0.6 % (ref 0–1.5)
BUN SERPL-MCNC: 17 MG/DL (ref 8–23)
BUN/CREAT SERPL: 11.1 (ref 7–25)
CALCIUM SPEC-SCNC: 8.7 MG/DL (ref 8.6–10.5)
CHLORIDE SERPL-SCNC: 108 MMOL/L (ref 98–107)
CO2 SERPL-SCNC: 26.7 MMOL/L (ref 22–29)
CREAT SERPL-MCNC: 1.53 MG/DL (ref 0.76–1.27)
DEPRECATED RDW RBC AUTO: 45.6 FL (ref 37–54)
EGFRCR SERPLBLD CKD-EPI 2021: 44.8 ML/MIN/1.73
EOSINOPHIL # BLD AUTO: 0.19 10*3/MM3 (ref 0–0.4)
EOSINOPHIL NFR BLD AUTO: 3.9 % (ref 0.3–6.2)
ERYTHROCYTE [DISTWIDTH] IN BLOOD BY AUTOMATED COUNT: 13.4 % (ref 12.3–15.4)
GLUCOSE BLDC GLUCOMTR-MCNC: 116 MG/DL (ref 70–130)
GLUCOSE BLDC GLUCOMTR-MCNC: 194 MG/DL (ref 70–130)
GLUCOSE SERPL-MCNC: 132 MG/DL (ref 65–99)
HCT VFR BLD AUTO: 34.3 % (ref 37.5–51)
HGB BLD-MCNC: 11.4 G/DL (ref 13–17.7)
LYMPHOCYTES # BLD AUTO: 1.28 10*3/MM3 (ref 0.7–3.1)
LYMPHOCYTES NFR BLD AUTO: 26.2 % (ref 19.6–45.3)
MAGNESIUM SERPL-MCNC: 2 MG/DL (ref 1.6–2.4)
MCH RBC QN AUTO: 31 PG (ref 26.6–33)
MCHC RBC AUTO-ENTMCNC: 33.2 G/DL (ref 31.5–35.7)
MCV RBC AUTO: 93.2 FL (ref 79–97)
MONOCYTES # BLD AUTO: 0.53 10*3/MM3 (ref 0.1–0.9)
MONOCYTES NFR BLD AUTO: 10.9 % (ref 5–12)
NEUTROPHILS NFR BLD AUTO: 2.84 10*3/MM3 (ref 1.7–7)
NEUTROPHILS NFR BLD AUTO: 58.2 % (ref 42.7–76)
PHOSPHATE SERPL-MCNC: 2.4 MG/DL (ref 2.5–4.5)
PHOSPHATE SERPL-MCNC: 2.9 MG/DL (ref 2.5–4.5)
PLATELET # BLD AUTO: 122 10*3/MM3 (ref 140–450)
PMV BLD AUTO: 11.3 FL (ref 6–12)
POTASSIUM SERPL-SCNC: 3.8 MMOL/L (ref 3.5–5.2)
RBC # BLD AUTO: 3.68 10*6/MM3 (ref 4.14–5.8)
SODIUM SERPL-SCNC: 141 MMOL/L (ref 136–145)
TROPONIN T SERPL-MCNC: 0.05 NG/ML (ref 0–0.03)
WBC NRBC COR # BLD: 4.88 10*3/MM3 (ref 3.4–10.8)

## 2022-10-12 PROCEDURE — 25010000002 INFLUENZA VAC HIGH-DOSE QUAD 0.7 ML SUSPENSION PREFILLED SYRINGE: Performed by: STUDENT IN AN ORGANIZED HEALTH CARE EDUCATION/TRAINING PROGRAM

## 2022-10-12 PROCEDURE — 80048 BASIC METABOLIC PNL TOTAL CA: CPT | Performed by: STUDENT IN AN ORGANIZED HEALTH CARE EDUCATION/TRAINING PROGRAM

## 2022-10-12 PROCEDURE — 84484 ASSAY OF TROPONIN QUANT: CPT | Performed by: INTERNAL MEDICINE

## 2022-10-12 PROCEDURE — 83735 ASSAY OF MAGNESIUM: CPT | Performed by: STUDENT IN AN ORGANIZED HEALTH CARE EDUCATION/TRAINING PROGRAM

## 2022-10-12 PROCEDURE — 85025 COMPLETE CBC W/AUTO DIFF WBC: CPT | Performed by: STUDENT IN AN ORGANIZED HEALTH CARE EDUCATION/TRAINING PROGRAM

## 2022-10-12 PROCEDURE — 99213 OFFICE O/P EST LOW 20 MIN: CPT | Performed by: INTERNAL MEDICINE

## 2022-10-12 PROCEDURE — 84100 ASSAY OF PHOSPHORUS: CPT | Performed by: INTERNAL MEDICINE

## 2022-10-12 PROCEDURE — 63710000001 INSULIN LISPRO (HUMAN) PER 5 UNITS: Performed by: NURSE PRACTITIONER

## 2022-10-12 PROCEDURE — G0008 ADMIN INFLUENZA VIRUS VAC: HCPCS | Performed by: STUDENT IN AN ORGANIZED HEALTH CARE EDUCATION/TRAINING PROGRAM

## 2022-10-12 PROCEDURE — G0378 HOSPITAL OBSERVATION PER HR: HCPCS

## 2022-10-12 PROCEDURE — 90662 IIV NO PRSV INCREASED AG IM: CPT | Performed by: STUDENT IN AN ORGANIZED HEALTH CARE EDUCATION/TRAINING PROGRAM

## 2022-10-12 PROCEDURE — 82962 GLUCOSE BLOOD TEST: CPT

## 2022-10-12 PROCEDURE — 84100 ASSAY OF PHOSPHORUS: CPT | Performed by: STUDENT IN AN ORGANIZED HEALTH CARE EDUCATION/TRAINING PROGRAM

## 2022-10-12 RX ORDER — IBUPROFEN 600 MG/1
TABLET ORAL
Qty: 1 EACH | Refills: 0 | Status: SHIPPED | OUTPATIENT
Start: 2022-10-12

## 2022-10-12 RX ADMIN — ATORVASTATIN CALCIUM 20 MG: 20 TABLET, FILM COATED ORAL at 09:07

## 2022-10-12 RX ADMIN — BUPROPION HYDROCHLORIDE 150 MG: 150 TABLET, EXTENDED RELEASE ORAL at 09:07

## 2022-10-12 RX ADMIN — Medication 10 ML: at 09:08

## 2022-10-12 RX ADMIN — INFLUENZA A VIRUS A/VICTORIA/2570/2019 IVR-215 (H1N1) ANTIGEN (FORMALDEHYDE INACTIVATED), INFLUENZA A VIRUS A/DARWIN/9/2021 SAN-010 (H3N2) ANTIGEN (FORMALDEHYDE INACTIVATED), INFLUENZA B VIRUS B/PHUKET/3073/2013 ANTIGEN (FORMALDEHYDE INACTIVATED), AND INFLUENZA B VIRUS B/MICHIGAN/01/2021 ANTIGEN (FORMALDEHYDE INACTIVATED) 0.7 ML: 60; 60; 60; 60 INJECTION, SUSPENSION INTRAMUSCULAR at 15:38

## 2022-10-12 RX ADMIN — FUROSEMIDE 40 MG: 40 TABLET ORAL at 09:08

## 2022-10-12 RX ADMIN — ASPIRIN 81 MG: 81 TABLET, FILM COATED ORAL at 09:07

## 2022-10-12 RX ADMIN — TAMSULOSIN HYDROCHLORIDE 0.8 MG: 0.4 CAPSULE ORAL at 09:08

## 2022-10-12 RX ADMIN — Medication 2 PACKET: at 06:53

## 2022-10-12 RX ADMIN — INSULIN LISPRO 2 UNITS: 100 INJECTION, SOLUTION INTRAVENOUS; SUBCUTANEOUS at 13:35

## 2022-10-12 NOTE — PROGRESS NOTES
LOS: 1 day   Patient Care Team:  Lucas Hou MD as PCP - General (Family Medicine)    Chief Complaint: Follow-up shortness of breath, elevated troponin.    Interval History: Shortness of breath is resolved.  No chest pain or other acute events.  He feels better today.    Vital Signs:  Temp:  [96.7 °F (35.9 °C)-98.1 °F (36.7 °C)] 97.4 °F (36.3 °C)  Heart Rate:  [69-70] 70  Resp:  [18] 18  BP: (129-148)/(66-74) 148/74    Intake/Output Summary (Last 24 hours) at 10/12/2022 1340  Last data filed at 10/11/2022 1925  Gross per 24 hour   Intake --   Output 750 ml   Net -750 ml       Physical Exam:   General Appearance:    No acute distress, alert and oriented x4   Lungs:     Clear to auscultation bilaterally     Heart:    Regular rhythm and normal rate.  No murmurs, gallops, or       rubs.   Abdomen:     Soft, nontender, nondistended.    Extremities:   Moves all extremities well.  No clubbing, cyanosis, or edema.     Results Review:    Results from last 7 days   Lab Units 10/12/22  0541   SODIUM mmol/L 141   POTASSIUM mmol/L 3.8   CHLORIDE mmol/L 108*   CO2 mmol/L 26.7   BUN mg/dL 17   CREATININE mg/dL 1.53*   GLUCOSE mg/dL 132*   CALCIUM mg/dL 8.7     Results from last 7 days   Lab Units 10/12/22  0541 10/11/22  1203 10/11/22  0930   TROPONIN T ng/mL 0.052* 0.043* 0.046*     Results from last 7 days   Lab Units 10/12/22  0541   WBC 10*3/mm3 4.88   HEMOGLOBIN g/dL 11.4*   HEMATOCRIT % 34.3*   PLATELETS 10*3/mm3 122*     Results from last 7 days   Lab Units 10/11/22  0141   INR  1.09   APTT seconds 27.6         Results from last 7 days   Lab Units 10/12/22  0541   MAGNESIUM mg/dL 2.0           I reviewed the patient's new clinical results.        Assessment:  1.  Hypoglycemia, treated  2.  Shortness of breath  3.  Elevated troponin of uncertain etiology currently  4.  Chronic kidney disease  5.  Right bundle branch block  6.  Hodgkin's lymphoma  7.  Asthma  8.  Thrombocytopenia    Plan:  -His troponin has been  fairly flat.  It is up just slightly this morning, although he is completely asymptomatic with no chest pain or shortness of breath today.    -He had normal wall motion on his echocardiogram, which I reviewed personally.  There is no evidence for an ischemic wall motion abnormality.    -I would treat him conservatively.  I do not feel that he requires further cardiac work-up or stress testing at this time.  He is stable for discharge from a cardiac standpoint.    Manuel Mosquera MD  10/12/22  13:40 EDT

## 2022-10-12 NOTE — DISCHARGE SUMMARY
Patient Name: Cheng Barros  : 1940  MRN: [id number]    Date of Admission: 10/11/2022  Date of Discharge:  10/12/2022  Primary Care Physician: Lucas Hou MD      Chief Complaint:   Shortness of Breath      Discharge Diagnoses     Active Hospital Problems    Diagnosis  POA   • **Shortness of breath [R06.02]  Yes   • Hypoglycemia [E16.2]  Yes   • Thrombocytopenia (HCC) [D69.6]  Yes   • Anemia, chronic disease [D63.8]  Yes   • Elevated troponin [R77.8]  Yes   • CKD (chronic kidney disease) stage 3, GFR 30-59 ml/min (HCC) [N18.30]  Yes      Resolved Hospital Problems   No resolved problems to display.        Hospital Course     Mr. Barros is a 81 y.o. male with a history of asthma, CKD, diabetes melitis type II, lymphoma who presented to [facility] initially complaining of shortness of breath.  Please see the admitting history and physical for further details.  He was found to have hypoglycemia and elevated troponin most likely contributing to shortness of breath and was admitted to the hospital for further evaluation and treatment.      Glucose 44, serum creatinine 1.6, and elevated troponin, and elevated left hemidiaphragm on chest x-ray likely contributing to shortness of breath given O2 saturation 100% on room air.  Unremarkable proBNP.  Shortness of breath resolved with resolution of hypoglycemia and bronchodilators as needed.      Cardiology evaluated patient elevated troponin noting trend fairly flat in the setting of a symptomatology.  Patient continued to deny chest pain and also shortness of breath prior to discharge.  Echocardiogram normal wall motion and no evidence of ischemic wall motion abnormality.  Stable from cardiac standpoint for discharge home without additional cardiac work-up or stress testing at this time.    Diabetic nurse educator discussed with patient effective low blood glucose treatment and considering CGM device for family to assist with as well as  glucagon kit for family to use with follow-up PCP.    Patient tolerating diet and minimal physical activity with assistance from staff.  Appears medically stable for discharge home with family and home health and agrees to follow-up as previously discussed.    Day of Discharge       Physical Exam:  Temp:  [96.7 °F (35.9 °C)-98.1 °F (36.7 °C)] 97.4 °F (36.3 °C)  Heart Rate:  [69-70] 70  Resp:  [18] 18  BP: (129-148)/(66-74) 148/74  Body mass index is 30.43 kg/m².     Physical Exam  Constitutional:       General: He is not in acute distress.     Appearance: He is not toxic-appearing.   HENT:      Head: Normocephalic and atraumatic.   Eyes:      Extraocular Movements: Extraocular movements intact.      Conjunctiva/sclera: Conjunctivae normal.   Cardiovascular:      Rate and Rhythm: Normal rate.      Heart sounds: Normal heart sounds.   Pulmonary:      Effort: Pulmonary effort is normal.      Breath sounds: Normal breath sounds.   Abdominal:      General: Bowel sounds are normal.      Palpations: Abdomen is soft.   Musculoskeletal:         General: No tenderness.      Cervical back: Normal range of motion and neck supple.      Right lower leg: No edema.      Left lower leg: No edema.   Skin:     General: Skin is warm and dry.   Neurological:      Mental Status: He is alert and oriented to person, place, and time.      Cranial Nerves: No cranial nerve deficit.   Psychiatric:         Behavior: Behavior normal.         Thought Content: Thought content normal.         Consultants     Consult Orders (all) (From admission, onward)     Start     Ordered    10/11/22 1158  Inpatient Diabetes Educator Consult  Once,   Status:  Canceled        Provider:  (Not yet assigned)    10/11/22 1157    10/11/22 0740  Inpatient Nutrition Consult  Once        Provider:  (Not yet assigned)    10/11/22 0739    10/11/22 0638  Inpatient Diabetes Educator Consult  Once        Comments: Blood sugar was in the 30's at home upon ems arrival    Provider:  (Not yet assigned)    10/11/22 0638    10/11/22 0637  Inpatient Consult to Advance Care Planning  Once        Provider:  (Not yet assigned)    10/11/22 0638    10/11/22 0637  Inpatient Case Management  Consult  Once        Provider:  (Not yet assigned)    10/11/22 0638    10/11/22 0554  Inpatient Cardiology Consult  Once        Specialty:  Cardiology  Provider:  Reina De Jesus MD    10/11/22 0554    10/11/22 0341  [facility] (on-call MD unless specified) Details  Once        Specialty:  Hospitalist  Provider:  Maribel Brice APRN    10/11/22 0340              Procedures     * Surgery not found *      Imaging Results (All)     Procedure Component Value Units Date/Time    XR Chest 1 View [[id number]] Collected: 10/11/22 0218     Updated: 10/11/22 0218    Narrative:        Patient: DANNIELLE CHAVES  Time Out: 02:17  Exam(s): FILM CXR 1 VIEW     EXAM:    XR Chest, 1 View    CLINICAL HISTORY:     Reason for exam: soa.    TECHNIQUE:    Frontal view of the chest.    COMPARISON:    No relevant prior studies available.    FINDINGS:    Lungs:  No consolidation.  No overt edema.    Pleural space:  No pleural effusion. No pneumothorax.    Heart:  Unremarkable.  No cardiomegaly.    Tubes, lines and devices:  Port-A-Cath in place.    Upper abdomen:  Elevated left hemidiaphragm.    IMPRESSION:         Elevated left hemidiaphragm.  Otherwise no radiographically evident   acute abnormality.      Impression:          Electronically signed by Chacho Aguilar MD on 10-11-22 at 0217          Results for orders placed during the hospital encounter of 10/11/22    Adult Transthoracic Echo Complete w/ Color, Spectral and Contrast if Necessary Per Protocol    Interpretation Summary  •  Calculated left ventricular EF = 55.3% Estimated left ventricular EF was in agreement with the calculated left ventricular EF. Left ventricular systolic function is normal.  •  Left ventricular diastolic function was normal.  •  No  significant valvular stenosis or regurgitation noted.  •  No evidence of pulmonary hypertension is present.    Pertinent Labs     Results from last 7 days   Lab Units 10/12/22  0541 10/11/22  0926 10/11/22  0141   WBC 10*3/mm3 4.88 5.19 8.69   HEMOGLOBIN g/dL 11.4* 11.8* 12.3*   PLATELETS 10*3/mm3 122* 123* 138*     Results from last 7 days   Lab Units 10/12/22  0541 10/11/22  0930 10/11/22  0141   SODIUM mmol/L 141 140 141   POTASSIUM mmol/L 3.8 3.8 3.8   CHLORIDE mmol/L 108* 104 103   CO2 mmol/L 26.7 24.8 30.3*   BUN mg/dL 17 17 18   CREATININE mg/dL 1.53* 1.62* 1.64*   GLUCOSE mg/dL 132* 155* 44*   EGFR mL/min/1.73 44.8* 41.9* 41.2*     Results from last 7 days   Lab Units 10/11/22  0141   ALBUMIN g/dL 4.20   BILIRUBIN mg/dL 0.5   ALK PHOS U/L 98   AST (SGOT) U/L 21   ALT (SGPT) U/L 14     Results from last 7 days   Lab Units 10/12/22  1335 10/12/22  0541 10/11/22  0930 10/11/22  0141   CALCIUM mg/dL  --  8.7 9.3 8.9   ALBUMIN g/dL  --   --   --  4.20   MAGNESIUM mg/dL  --  2.0  --   --    PHOSPHORUS mg/dL 2.9 2.4*  --   --        Results from last 7 days   Lab Units 10/12/22  0541 10/11/22  1203 10/11/22  0930 10/11/22  0141   TROPONIN T ng/mL 0.052* 0.043* 0.046* 0.041*   PROBNP pg/mL  --   --   --  953.0           Invalid input(s): LDLCALC          Test Results Pending at Discharge         Discharge Details        Discharge Medications      New Medications      Instructions Start Date   Glucagon Emergency 1 MG kit   Use as directed for emergently low blood glucose level. Formulary Compliance Approval      glucose blood test strip   Use as instructed      Glucose Management tablet   Use as needed for emergently low blood glucose levels. Formulary Compliance Approval         Continue These Medications      Instructions Start Date   aspirin 81 MG chewable tablet   81 mg, Oral, Daily      atorvastatin 20 MG tablet  Commonly known as: LIPITOR   20 mg, Oral, Daily      buPROPion  MG 12 hr tablet  Commonly  known as: WELLBUTRIN SR   150 mg, Oral, 2 Times Daily      furosemide 40 MG tablet  Commonly known as: LASIX   40 mg, Oral, Daily      potassium chloride 10 MEQ CR tablet   10 mEq, Oral, Daily      tamsulosin 0.4 MG capsule 24 hr capsule  Commonly known as: FLOMAX   2 capsules, Oral, Daily             Not on File    Discharge Disposition:  Home-Health Care Svc      Discharge Diet:  Diet Order   Procedures   • Diet Regular; Cardiac       Discharge Activity:   Activity Instructions     Activity as Tolerated            CODE STATUS:    Code Status and Medical Interventions:   Ordered at: 10/11/22 0554     Code Status (Patient has no pulse and is not breathing):    CPR (Attempt to Resuscitate)     Medical Interventions (Patient has pulse or is breathing):    Full Support       No future appointments.  Additional Instructions for the Follow-ups that You Need to Schedule     Ambulatory Referral to Home Health   As directed      Face to Face Visit Date: 10/12/2022    Follow-up provider for Plan of Care?: I treated the patient in an acute care facility and will not continue treatment after discharge.    Follow-up provider: WILLY ESCOBAR [[id number]]    Reason/Clinical Findings: hypoglycemia / DM management    Describe mobility limitations that make leaving home difficult: hypoglycemia / DM management    Nursing/Therapeutic Services Requested: Skilled Nursing Physical Therapy    Skilled nursing orders: Other (glucose monitoring) Medication education    PT orders: Strengthening Gait Training    Weight Bearing Status: As Tolerated    Frequency: 1 Week 1         Ambulatory Referral to Home Health ([facility])   As directed      Face to Face Visit Date: 10/12/2022    Follow-up provider for Plan of Care?: I treated the patient in an acute care facility and will not continue treatment after discharge.    Follow-up provider: WILLY ESCOBAR [[id number]]    Reason/Clinical Findings: Diabetes management and weakness    Describe  mobility limitations that make leaving home difficult: Pt unable to ambulate    Nursing/Therapeutic Services Requested: Skilled Nursing Physical Therapy    Skilled nursing orders: Medication education (Diabetes management) Other    PT orders: Transfer training Strengthening    Frequency: 1 Week 1         Discharge Follow-up with PCP   As directed       Currently Documented PCP:    Willy Hou MD    PCP Phone Number:    [phone]     Follow Up Details: Please call to schedule follow-up with PCP for diabetic supplies & continued monitoring of glucose            Contact information for follow-up providers     Willy Hou MD .    Specialty: Family Medicine  Why: Please call to schedule follow-up with PCP for diabetic supplies & continued monitoring of glucose  Contact information:  [address] [facility] [address]  [phone]                   Contact information for after-discharge care     Home Medical Care     [facility] .    Service: Home Health Services  Contact information:  [address] [address] [phone]                             Additional Instructions for the Follow-ups that You Need to Schedule     Ambulatory Referral to Home Health   As directed      Face to Face Visit Date: 10/12/2022    Follow-up provider for Plan of Care?: I treated the patient in an acute care facility and will not continue treatment after discharge.    Follow-up provider: WILLY HOU [[id number]]    Reason/Clinical Findings: hypoglycemia / DM management    Describe mobility limitations that make leaving home difficult: hypoglycemia / DM management    Nursing/Therapeutic Services Requested: Skilled Nursing Physical Therapy    Skilled nursing orders: Other (glucose monitoring) Medication education    PT orders: Strengthening Gait Training    Weight Bearing Status: As Tolerated    Frequency: 1 Week 1         Ambulatory Referral to  Home Health (Hospital)   As directed      Face to Face Visit Date: 10/12/2022    Follow-up provider for Plan of Care?: I treated the patient in an acute care facility and will not continue treatment after discharge.    Follow-up provider: WILLY HOU [[id number]]    Reason/Clinical Findings: Diabetes management and weakness    Describe mobility limitations that make leaving home difficult: Pt unable to ambulate    Nursing/Therapeutic Services Requested: Skilled Nursing Physical Therapy    Skilled nursing orders: Medication education (Diabetes management) Other    PT orders: Transfer training Strengthening    Frequency: 1 Week 1         Discharge Follow-up with PCP   As directed       Currently Documented PCP:    Willy Hou MD    PCP Phone Number:    [phone]     Follow Up Details: Please call to schedule follow-up with PCP for diabetic supplies & continued monitoring of glucose           Time Spent on Discharge:  Greater than 30 minutes      ARLETH Escamilla  [facility]  10/12/22  13:54 EDT

## 2022-10-12 NOTE — PROGRESS NOTES
[facility] will follow post hospital. Patient agreeable to services. Contact information confirmed. Correct phone # is [phone]. Patient denies currently receiving any home health services.

## 2022-10-12 NOTE — CASE MANAGEMENT/SOCIAL WORK
Continued Stay Note  [facility]     Patient Name: Cheng Barros  MRN: [id number]  Today's Date: 10/12/2022    Admit Date: 10/11/2022    Plan: Home with family and [facility] to see for Diabetes Management and PT. Family to transport home.   Discharge Plan     Row Name 10/12/22 1211       Plan    Plan Home with family and [facility] to see for Diabetes Management and PT. Family to transport home.    Patient/Family in Agreement with Plan yes    Plan Comments Met with pt. at bedside. Explained roll of . Face sheet and pharmacy verified. Pt states his son and daughter in law live with him in the basement. States him and his 18 yr. old grandson live on the main floor. There is a ramp to enter home.  DME equipment includes motorized scooter, wheelchair, glucometer, and ramp.  Pt is requires assist ADLs. Pt states his son takes care of everything around the house and his grandson helps him as well. Pt has never been to Rehab or used HH. Discussed PT recommends HH at D/C. Pt agreeable and wants [facility] to see at D/C. Spoke to [facility], they will see for diabetes management, and PT. Pt’s PCP is Lucas Hou MD. Uses [facility] at [place] and [address] Pt’s son drives him to appointments. At discharge, family will transport. Explained that CCP would follow to assess for discharge needs.  Randy Wise RN-BC               Discharge Codes    No documentation.               Expected Discharge Date and Time     Expected Discharge Date Expected Discharge Time    Oct 13, 2022             Randy Wise RN

## 2022-10-12 NOTE — NURSING NOTE
Troponin back up today( 0.052),called cardio,awaiting response .Phos 2.4 ,replaced per protocol ,recheck later today .WCTM.

## 2022-10-12 NOTE — CASE MANAGEMENT/SOCIAL WORK
Case Management Discharge Note      Final Note: [facility] with [facility] and [facility]  to [facility] for Diabetes Management and PT. Family to transport home.    Provided Post Acute Provider List?: Refused  Refused Provider List Comment: requests referral to [facility]  Provided Post Acute Provider Quality & Resource List?: Refused    Selected Continued Care - Admitted Since 10/11/2022     Destination    No services have been selected for the patient.              Durable Medical Equipment    No services have been selected for the patient.              Dialysis/Infusion    No services have been selected for the patient.              Home Medical Care Coordination complete.    Service Provider Selected Services Address Phone Fax Patient Preferred    [facility] [address] [address] [phone] --          Therapy    No services have been selected for the patient.              Community Resources    No services have been selected for the patient.              Community & DME    No services have been selected for the patient.                  Transportation Services  Private: Car    Final Discharge Disposition Code: 06 - home with home health care

## 2022-10-12 NOTE — DISCHARGE PLACEMENT REQUEST
"Dannielle Barros (81 y.o. Male)     Date of Birth   1940    Social Security Number       Address   [address] [place] [address]    Home Phone   [phone]    MRN   [id number]       [employer]ybeterian   None    Marital Status                               Admission Date   10/11/22    Admission Type   Emergency    Admitting Provider   Kevon Noyola MD    Attending Provider   Carlos Hong DO    Department, Room/Bed   [address], E464/1       Discharge Date       Discharge Disposition       Discharge Destination                               Attending Provider: Carlos Hong DO    Allergies: Not on File    Isolation: None   Infection: None   Code Status: CPR    Ht: 193 cm (76\")   Wt: 113 kg (250 lb)    Admission Cmt: None   Principal Problem: Shortness of breath [R06.02]                 Active Insurance as of 10/11/2022     Primary Coverage     Payor Plan Insurance Group Employer/Plan Group    ANTHEM MEDICARE REPLACEMENT ANTHEM MEDICARE ADVANTAGE [id number]     Payor Plan Address Payor Plan Phone Number Payor Plan Fax Number Effective Dates     [id number] [phone]  1/1/2022 - None Entered    [facility] [address]       Subscriber Name Subscriber Birth Date Member ID       DANNIELLE BARROS 1940 [id number]                 Emergency Contacts      (Rel.) Home Phone Work Phone Mobile Phone    adele barros (Son) [phone] -- --              "

## 2022-10-12 NOTE — CASE MANAGEMENT/SOCIAL WORK
Discharge Planning Assessment  [facility]     Patient Name: Cheng Barros  MRN: [id number]  Today's Date: 10/12/2022    Admit Date: 10/11/2022    Plan: Home with family and [facility]  to see for Diabetes Management and PT. Family to transport home.   Discharge Needs Assessment     Row Name 10/12/22 1152       Living Environment    People in Home child(jerman), adult;grandchild(jerman)    Name(s) of People in Home Pt states his son and daughter in law live with him in the basement. States him and his 18 yr old grandson live on the main floor.    Current Living Arrangements home    Primary Care Provided by self;child(jemran)    Provides Primary Care For no one, unable/limited ability to care for self    Family Caregiver if Needed child(jerman), adult;grandchild(jerman), adult    Quality of Family Relationships unable to assess    Able to Return to Prior Arrangements yes       Resource/Environmental Concerns    Resource/Environmental Concerns none    Transportation Concerns none       Transition Planning    Patient/Family Anticipates Transition to home with family    Patient/Family Anticipated Services at Transition home health care    Transportation Anticipated family or friend will provide       Discharge Needs Assessment    Readmission Within the Last 30 Days no previous admission in last 30 days    Equipment Currently Used at Home ramp;wheelchair;glucometer;other (see comments)  Motorized scooter    Concerns to be Addressed care coordination/care conferences;discharge planning    Anticipated Changes Related to Illness none    Equipment Needed After Discharge none    Discharge Facility/Level of Care Needs home with home health    Provided Post Acute Provider List? Refused    Refused Provider List Comment requests referral to [facility]    Provided Post Acute Provider Quality & Resource List? Refused    Current Discharge Risk chronically ill;dependent with mobility/activities of daily living               Discharge Plan      Row Name 10/12/22 1211       Plan    Plan Home with family and [facility] to see for Diabetes Management and PT. Family to transport home.    Patient/Family in Agreement with Plan yes    Plan Comments Met with pt. at bedside. Explained roll of . Face sheet and pharmacy verified. Pt states his son and daughter in law live with him in the basement. States him and his 18 yr. old grandson live on the main floor. There is a ramp to enter home.  DME equipment includes motorized scooter, wheelchair, glucometer, and ramp.  Pt is requires assist ADLs. Pt states his son takes care of everything around the house and his grandson helps him as well. Pt has never been to Rehab or used HH. Discussed PT recommends HH at D/C. Pt agreeable and wants [facility] to see at D/C. Spoke to [facility], they will see for diabetes management, and PT. Pt’s PCP is Lucas Hou MD. Uses [facility] and [facility] [place]. Pt’s son drives him to appointments. At discharge, family will transport. Explained that CCP would follow to assess for discharge needs.  Randy Wise RN-BC              Continued Care and Services - Admitted Since 10/11/2022     Home Medical Care     Service Provider Request Status Selected Services Address Phone Fax Patient Preferred    [facility] - [facility] N[facility]A [address] [facility] [address] [address] [phone] --              Expected Discharge Date and Time     Expected Discharge Date Expected Discharge Time    Oct 13, 2022          Demographic Summary     Row Name 10/12/22 1151       General Information    Admission Type observation    Arrived From emergency department    Required Notices Provided Observation Status Notice    Reason for Consult discharge planning;decision-making;care coordination/care conference    Preferred Language English               Functional Status     Row Name 10/12/22 1151       Functional Status    Usual  Activity Tolerance fair    Current Activity Tolerance fair       Functional Status, IADL    Medications assistive equipment and person    Meal Preparation assistive equipment and person    Housekeeping assistive equipment and person    Laundry assistive equipment and person    Shopping assistive equipment and person       Mental Status Summary    Recent Changes in Mental Status/Cognitive Functioning no changes                  Randy Wise, RN

## 2022-10-12 NOTE — PLAN OF CARE
Goal Outcome Evaluation:                A&O*3.slept through the night.Denies any pain/discomfort.Sats stable on R/A,BS stable.WCTM.

## 2022-10-13 ENCOUNTER — HOME CARE VISIT (OUTPATIENT)
Dept: HOME HEALTH SERVICES | Facility: HOME HEALTHCARE | Age: 82
End: 2022-10-13

## 2022-10-13 VITALS
SYSTOLIC BLOOD PRESSURE: 122 MMHG | HEART RATE: 80 BPM | RESPIRATION RATE: 16 BRPM | BODY MASS INDEX: 30.43 KG/M2 | TEMPERATURE: 97.4 F | DIASTOLIC BLOOD PRESSURE: 58 MMHG | OXYGEN SATURATION: 99 % | HEIGHT: 76 IN

## 2022-10-13 PROCEDURE — G0299 HHS/HOSPICE OF RN EA 15 MIN: HCPCS

## 2022-10-14 ENCOUNTER — HOME CARE VISIT (OUTPATIENT)
Dept: HOME HEALTH SERVICES | Facility: HOME HEALTHCARE | Age: 82
End: 2022-10-14

## 2022-10-14 VITALS
OXYGEN SATURATION: 97 % | SYSTOLIC BLOOD PRESSURE: 118 MMHG | TEMPERATURE: 95.9 F | RESPIRATION RATE: 16 BRPM | DIASTOLIC BLOOD PRESSURE: 64 MMHG | HEART RATE: 68 BPM

## 2022-10-14 PROCEDURE — G0299 HHS/HOSPICE OF RN EA 15 MIN: HCPCS

## 2022-10-14 NOTE — HOME HEALTH
PT ADMITTED 10/11-10/12 TO [facility] FOR  HYPOGLYCEMIA, SOA AND ELEVATED TROPINON. CARDIOLOGY RULLED OUT CARDIAC EVENT.   PMH CKD STAGE 3, DM, HODGKINS LYMPHOMA,     PT W/C BOUND AND USES ELECTRIC WHEELCHAIR   SON AND DA INLAW LIVES IN BASEMENT AND ASSIST WITH CARE  PT GETS -10  ON 10/18 GO BETWEEN 10-1 DUE TO APPT WITH ONCOLOGY AT 2 PM  PT FORGETFUL   SEE IF CHEM STIRPS AND LANCET OBTAINED STRIPS  WERE OUT AND HE WAS USING  STRIPS  MD WAS TO CALLIN  PT NEEDS ALOT OF EDUCATION ON DM   DIABETIC BOOKLET GIVEN TO DA IN LAW AND PT    FOC SN TO SEE FOR C/P ASSESS, INSTRUCT ON DISEASE PROCESS OF DM AND S/S EXACERBATION, MED INSTRUCT, INSTRUC TON BLEEDING PRECAUTION, PAIN MANGEMENT. PHY THERPAY TO EVALUATE AND TREAT DUE TO GENERLAIZED WEAKNESS AND DECREASE MOBILITY

## 2022-10-17 ENCOUNTER — HOME CARE VISIT (OUTPATIENT)
Dept: HOME HEALTH SERVICES | Facility: HOME HEALTHCARE | Age: 82
End: 2022-10-17

## 2022-10-17 NOTE — HOME HEALTH
Reason for referral    Diagnosis Patient admitted to [facility] 10/11 to 10/12/22 with SOB due to hypoglycemia, thrombocytopenia    surgical procedure na    PMHx anemia, CKD, asthma, lymphoma    Prior level of function    Home social environment    Next MD appointment

## 2022-10-17 NOTE — CASE COMMUNICATION
Attempted to see patient today for PT evaluation, however, patient had MD visit. Unable to see 10/18/22 due to patient having tests at the hospital, patient preferred PT to reschedule visit for 10/19/22

## 2022-10-18 ENCOUNTER — HOME CARE VISIT (OUTPATIENT)
Dept: HOME HEALTH SERVICES | Facility: HOME HEALTHCARE | Age: 82
End: 2022-10-18

## 2022-10-18 PROCEDURE — G0300 HHS/HOSPICE OF LPN EA 15 MIN: HCPCS

## 2022-10-19 ENCOUNTER — HOME CARE VISIT (OUTPATIENT)
Dept: HOME HEALTH SERVICES | Facility: HOME HEALTHCARE | Age: 82
End: 2022-10-19

## 2022-10-19 VITALS
RESPIRATION RATE: 18 BRPM | HEART RATE: 68 BPM | SYSTOLIC BLOOD PRESSURE: 128 MMHG | OXYGEN SATURATION: 100 % | TEMPERATURE: 97.7 F | DIASTOLIC BLOOD PRESSURE: 70 MMHG

## 2022-10-19 VITALS
SYSTOLIC BLOOD PRESSURE: 142 MMHG | DIASTOLIC BLOOD PRESSURE: 70 MMHG | TEMPERATURE: 96.7 F | OXYGEN SATURATION: 99 % | HEART RATE: 67 BPM

## 2022-10-19 PROCEDURE — G0151 HHCP-SERV OF PT,EA 15 MIN: HCPCS

## 2022-10-19 NOTE — HOME HEALTH
Patient CBG below 150 every morning but is above 300 in evenings. Patient reports he is following diet and taking medications as prescribed. LPN educated further on use of insulin and diet. Patient seems to not be willing to accept change in current actions. Patient does not want to eat and take insulin. He will skip insulin and not eat and then CBG rises above 200 by evening. LPN instructed to eat a low carb/sugar with protein meal with insulin in AM. Patient states he does not eat breakfast and dont think he will. LPN educated on importance. Patient reports drinking 2-3 mountain dews a day not diet. LPN instructed patient to avoid these.      Plans for next visit- T/I DM

## 2022-10-19 NOTE — HOME HEALTH
Reason for referral Patient with impaired mobility due to chronic back pain     Diagnosis Patient admitted to [facility] 10/11 to 10/12/22 with SOB, hypoglycemia, thrombocytopenia    surgical procedure na    PMHx  anemia, CKD, asthma, lymphoma    Prior level of function Patient uses scooter for mobility, independent to min assist x 1  with transfers patient is independent with ADLs, family does the cooking, laundry, does not drive     Home social environment Patient resides with son and his family, has ramp to enter and exit home     Next MD appointment TBD    Patient reports he has chronic pain that limits his mobility. He stays in his scooter most of the day. He is at his baseline with his transfers and is able to perform his bed, commoded and chair transfers independently or if he needs assist, he has family around that will help. His grandson will assist with bathroom transfers. Patient has declined rachna grace PT stating that he is doing fine and he has all the help that he needs.

## 2022-10-20 ENCOUNTER — HOME CARE VISIT (OUTPATIENT)
Dept: HOME HEALTH SERVICES | Facility: HOME HEALTHCARE | Age: 82
End: 2022-10-20

## 2022-10-20 VITALS
TEMPERATURE: 96.7 F | RESPIRATION RATE: 16 BRPM | OXYGEN SATURATION: 100 % | DIASTOLIC BLOOD PRESSURE: 66 MMHG | HEART RATE: 60 BPM | SYSTOLIC BLOOD PRESSURE: 132 MMHG

## 2022-10-20 PROCEDURE — G0299 HHS/HOSPICE OF RN EA 15 MIN: HCPCS

## 2022-10-20 NOTE — HOME HEALTH
PT ADMITTED 10/11-10/12 TO [facility] FOR HYPOGLYCEMIA, SOA AND ELEVATED TROPINON. CARDIOLOGY RULLED OUT CARDIAC EVENT. PMH CKD STAGE 3, DM, HODGKINS LYMPHOMA, PT W/C BOUND AND USES ELECTRIC WHEELCHAIR SON AND DA INLAW LIVES IN BASEMENT AND ASSIST WITH CARE PT GETS -10 ON 10/18 GO BETWEEN 10-1 DUE TO APPT WITH ONCOLOGY AT 2 PM PT FORGETFUL SEE IF CHEM STIRPS AND LANCET OBTAINED STRIPS WERE OUT AND HE WAS USING  STRIPS MD WAS TO CALLIN PT NEEDS ALOT OF EDUCATION ON DM DIABETIC BOOKLET GIVEN TO DA IN LAW AND PT  PT ON TAXOL FOR HODGKIN LYMPHOMA  GOES   MD DECREASE INSULIN TO 44 UNITS DAILY                General

## 2022-10-24 ENCOUNTER — HOME CARE VISIT (OUTPATIENT)
Dept: HOME HEALTH SERVICES | Facility: HOME HEALTHCARE | Age: 82
End: 2022-10-24

## 2022-10-24 VITALS
SYSTOLIC BLOOD PRESSURE: 126 MMHG | OXYGEN SATURATION: 100 % | DIASTOLIC BLOOD PRESSURE: 72 MMHG | HEART RATE: 68 BPM | RESPIRATION RATE: 16 BRPM | TEMPERATURE: 95.8 F

## 2022-10-24 PROCEDURE — G0299 HHS/HOSPICE OF RN EA 15 MIN: HCPCS

## 2022-10-24 NOTE — HOME HEALTH
PT WITH DEXACOM 6   ABLE TO MONITOR BS NOW   DA IN LAW SET UP  [facility] IF STABLE     PT WITH BILLS ASKED IF HE NEEDS MSW   PT NTOED HE NEEDS TO TALK TO SON AND DINORA IN LAW NOTED AT ONCOLOGY TALK TO MSW THERE

## 2022-10-27 ENCOUNTER — HOME CARE VISIT (OUTPATIENT)
Dept: HOME HEALTH SERVICES | Facility: HOME HEALTHCARE | Age: 82
End: 2022-10-27

## 2022-10-27 VITALS
RESPIRATION RATE: 16 BRPM | HEART RATE: 64 BPM | DIASTOLIC BLOOD PRESSURE: 68 MMHG | SYSTOLIC BLOOD PRESSURE: 138 MMHG | TEMPERATURE: 95.7 F | OXYGEN SATURATION: 100 %

## 2022-10-27 PROCEDURE — G0299 HHS/HOSPICE OF RN EA 15 MIN: HCPCS

## 2022-10-28 NOTE — HOME HEALTH
PT WITH DEXACOM 6 ABLE TO MONITOR BS NOW DA IN LAW SET UP [facility] IF STABLE PT WITH BILLS ASKED IF HE NEEDS MSW PT NTOED HE NEEDS TO TALK TO SON AND DA IN LAW NOTED AT ONCOLOGY TALK TO MSW THERE  PT FORGOT TO TALK TO DA IN LAW  SO HE AGREE FOR MSW  RN CALLED PCP FOR ORDER      SON AND DA IN LAW NEVER HERE OR COME IN WHEN RN IS HERE.    IN [place] TODAY    PT FORGETFUL AND FORGETS WHAT RN  TELLS  HIM      BS RUNNING  BUT DIET INCONSISTENT  HOLD DC

## 2022-11-02 ENCOUNTER — HOME CARE VISIT (OUTPATIENT)
Dept: HOME HEALTH SERVICES | Facility: HOME HEALTHCARE | Age: 82
End: 2022-11-02

## 2022-11-02 PROCEDURE — G0155 HHCP-SVS OF CSW,EA 15 MIN: HCPCS

## 2022-11-02 NOTE — HOME HEALTH
KATHY loco on this date with patient. Patient's son, DIL and grandson live in home and assist with caregiving, housekeeping, groceries. Patient reports needing assistance with finanical support. Referral to [facility] for utility assistance and for food pantry/senior commodity program. Patient reports grief related to loss of wife 2 years ago but is coping appropriately. Referral to [facility] for in home communion, Communication with KRISTIN Brasher. [FreeTextEntry1] : will observe.bloods drawn. to call next week for results. additional  sonogram and blood tests per dr munroe. to return earlier if any change

## 2022-11-03 ENCOUNTER — HOME CARE VISIT (OUTPATIENT)
Dept: HOME HEALTH SERVICES | Facility: HOME HEALTHCARE | Age: 82
End: 2022-11-03

## 2022-11-03 PROCEDURE — G0299 HHS/HOSPICE OF RN EA 15 MIN: HCPCS

## 2022-11-04 VITALS
OXYGEN SATURATION: 100 % | DIASTOLIC BLOOD PRESSURE: 60 MMHG | SYSTOLIC BLOOD PRESSURE: 128 MMHG | HEART RATE: 80 BPM | RESPIRATION RATE: 20 BRPM | TEMPERATURE: 97.6 F

## 2022-11-05 NOTE — HOME HEALTH
PT WITH DM AND HAD FREQ HOSPITALIZATIONS DUE TO HYPOGLYCEMIA   PT ALSO WITH HODGKINS LYMPHOMA AND RECEIVING TREATMENT    EFFECTING APPETIE PT USUALLY ONLY EATING 2 MEALS PER DAY  HE IS,FORGETFUL AND TODAY GRANDSON HAD HELPED HIM PUT ON SENSOR FOR DEXCOM  BUT NOT WORKING  RN ENDED UP HAVING TO REPLACE AND CONTACT SUPPORT  AND NEW SENSOR BEING SENT   PT CONTINUES OT HOLD INSULIN INAPPROPRIATELY THEN HAS HYPERGLYCEMIA  ORDER OBTAINED FOR SLP TO EVLAUATE FORGETFULNESS.   PT LIVES IWTH SON AND DA IN LAW BUT THEY DO NOT COME OUT WHEN RN IS PRESENT TODAY DA IN LAW NOT HOME DUE TO HAVING TO HELP HER PARENTS WHO ARE ILL PER PT.   PT ALSO HAVING TROUBLE TRANSFERRING ONTO BSC  PT HAS CHRONIC BACK PAIN AND IS NONAMBULATORY  ORDER FOR OT OBTAINED NEEDS FURTHER EDUCATION ON DIET, DEXCOM MONITOR AND NEED TO BE COMPLIANT WITH INSULINN

## 2022-11-08 ENCOUNTER — HOME CARE VISIT (OUTPATIENT)
Dept: HOME HEALTH SERVICES | Facility: HOME HEALTHCARE | Age: 82
End: 2022-11-08

## 2022-11-08 VITALS
DIASTOLIC BLOOD PRESSURE: 68 MMHG | OXYGEN SATURATION: 100 % | TEMPERATURE: 97.1 F | HEART RATE: 70 BPM | SYSTOLIC BLOOD PRESSURE: 124 MMHG | RESPIRATION RATE: 18 BRPM

## 2022-11-08 PROCEDURE — G0153 HHCP-SVS OF S/L PATH,EA 15MN: HCPCS

## 2022-11-09 ENCOUNTER — HOME CARE VISIT (OUTPATIENT)
Dept: HOME HEALTH SERVICES | Facility: HOME HEALTHCARE | Age: 82
End: 2022-11-09

## 2022-11-09 PROCEDURE — G0152 HHCP-SERV OF OT,EA 15 MIN: HCPCS

## 2022-11-09 NOTE — HOME HEALTH
REASON FOR REFERRAL: Speech Therapy referral to assess and establish a plan of care due to recent decline in cognitive skills/memory.   PRIMARY DIAGNOSIS  Cognitive impairment   SECONDARY DIAGNOSIS  Type 2 diabetes mellitus with hypoglycemia without coma  PERTINENT HISTORY Shortness of breath, Acute kidney injury, Hypoglycemia, Thrombocytopenia, Anemia, chronic disease, CKD (chronic kidney disease) stage 3,  PRIOR VFSS or FEES   NA  PRIOR LEVEL OF FUNCTION: Patient living in his single family home. Patient's family provided assistance with meals, household.     FOCUS OF CARE:  Cognitive linguistic therapy and education    PLAN FOR NEXT VISIT  MEDICAL NECESSITY FOR ONGOING SKILLED THERAPY: Cognitive linguistic therapy to restore cognition to increase safety, communication and function in home. Skilled Speech Therapy interventions are necessary due to decline in memory skills and increased dependence on caregivers.   SPECIFIC INTERVENTIONS AND GOALS TO ADDRESS ON NEXT VISIT:  Memory strategies  Education on external memory aids  Cognitive tasks  FREQUENCY AND DURATION: 2w3  ANY OTHER FOLLOW UP NEEDED: None  REASSESSMENT DUE DATE: 11/26/22

## 2022-11-10 ENCOUNTER — HOME CARE VISIT (OUTPATIENT)
Dept: HOME HEALTH SERVICES | Facility: HOME HEALTHCARE | Age: 82
End: 2022-11-10

## 2022-11-10 VITALS
SYSTOLIC BLOOD PRESSURE: 128 MMHG | DIASTOLIC BLOOD PRESSURE: 64 MMHG | OXYGEN SATURATION: 98 % | TEMPERATURE: 96 F | HEART RATE: 68 BPM | RESPIRATION RATE: 16 BRPM

## 2022-11-10 PROCEDURE — G0299 HHS/HOSPICE OF RN EA 15 MIN: HCPCS

## 2022-11-11 VITALS — HEART RATE: 73 BPM | SYSTOLIC BLOOD PRESSURE: 140 MMHG | DIASTOLIC BLOOD PRESSURE: 82 MMHG | OXYGEN SATURATION: 98 %

## 2022-11-11 NOTE — HOME HEALTH
PT FORGETFUL WITH DM  HAS DEXCOM 6 AND DA IN LAW HELPED BUT NOW TAKING CARE OF HER  PARENTS  PT JONA DUFF WHEN RN INSTRUCTS HIM ON PROCEDURES TODAY NOTED  SENSOR HAS TO BE CHANGED SUNDAY   ALSO IT HAS TO BE CALIBRATED   RN CALIBRATED TODAY AND SHOWED PT HOW TO CALIBRATE  SON CALLED  AND I/S HIM TO ASSIST PT

## 2022-11-11 NOTE — HOME HEALTH
Reason for Referral:  Chronic back pain    Medical History:  SOA, Hypoglycemia, Thrombocytopenia, Anemia, CKD, Asthma, Lymphoma    Subjective: My back hurts whenever I move    Home Environment: Patient lives with supportive family who assist, has ramp     PLOF: Family assist    Patient declined any further OT, did not want to address commode transfers or adl tasks, stating he is managing and his family assists as needed.

## 2022-11-14 ENCOUNTER — HOME CARE VISIT (OUTPATIENT)
Dept: HOME HEALTH SERVICES | Facility: HOME HEALTHCARE | Age: 82
End: 2022-11-14

## 2022-11-14 VITALS
DIASTOLIC BLOOD PRESSURE: 60 MMHG | RESPIRATION RATE: 18 BRPM | OXYGEN SATURATION: 99 % | HEART RATE: 69 BPM | TEMPERATURE: 99.1 F | SYSTOLIC BLOOD PRESSURE: 118 MMHG

## 2022-11-14 PROCEDURE — G0153 HHCP-SVS OF S/L PATH,EA 15MN: HCPCS

## 2022-11-15 NOTE — HOME HEALTH
Patient greeted therapist at the door in his wheelchair and reported doing ok today. He reported that his back was hurting, but it is a chronic condition.     PLAN FOR NEXT VISIT   MEDICAL NECESSITY FOR ONGOING SKILLED THERAPY: Cognitive linguistic therapy to restore cognition to increase safety, communication and function in home. Skilled Speech Therapy interventions are necessary due to decline in memory skills and increased dependence on caregivers.   SPECIFIC INTERVENTIONS AND GOALS TO ADDRESS ON NEXT VISIT:   Memory strategies   Education on external memory aids   Cognitive tasks   FREQUENCY AND DURATION: 2w3   ANY OTHER FOLLOW UP NEEDED: None   REASSESSMENT DUE DATE: 11/26/22

## 2022-11-16 ENCOUNTER — HOME CARE VISIT (OUTPATIENT)
Dept: HOME HEALTH SERVICES | Facility: HOME HEALTHCARE | Age: 82
End: 2022-11-16

## 2022-11-16 VITALS
OXYGEN SATURATION: 100 % | RESPIRATION RATE: 18 BRPM | HEART RATE: 67 BPM | TEMPERATURE: 97.3 F | SYSTOLIC BLOOD PRESSURE: 122 MMHG | DIASTOLIC BLOOD PRESSURE: 64 MMHG

## 2022-11-16 PROCEDURE — G0153 HHCP-SVS OF S/L PATH,EA 15MN: HCPCS

## 2022-11-17 ENCOUNTER — HOME CARE VISIT (OUTPATIENT)
Dept: HOME HEALTH SERVICES | Facility: HOME HEALTHCARE | Age: 82
End: 2022-11-17

## 2022-11-17 PROCEDURE — G0299 HHS/HOSPICE OF RN EA 15 MIN: HCPCS

## 2022-11-18 VITALS
SYSTOLIC BLOOD PRESSURE: 134 MMHG | HEART RATE: 72 BPM | DIASTOLIC BLOOD PRESSURE: 78 MMHG | OXYGEN SATURATION: 99 % | TEMPERATURE: 96.5 F | RESPIRATION RATE: 20 BRPM

## 2022-11-18 NOTE — HOME HEALTH
PT SEEN BY [facility] FOR  HOSPITALIZATIONS DUE TO HYPOGLYCEMIA.  PT CHANGED SENSOR ON HIS OWN SUN  ASKED IF HE CALIBRATED  AFTER IT WARMED UP PT NOTED HE HAD NOT  RN HAD PT PERFORM CALIBRATION

## 2022-11-21 ENCOUNTER — HOME CARE VISIT (OUTPATIENT)
Dept: HOME HEALTH SERVICES | Facility: HOME HEALTHCARE | Age: 82
End: 2022-11-21

## 2022-11-21 VITALS
DIASTOLIC BLOOD PRESSURE: 64 MMHG | OXYGEN SATURATION: 99 % | TEMPERATURE: 97.1 F | RESPIRATION RATE: 18 BRPM | HEART RATE: 65 BPM | SYSTOLIC BLOOD PRESSURE: 124 MMHG

## 2022-11-21 PROCEDURE — G0153 HHCP-SVS OF S/L PATH,EA 15MN: HCPCS

## 2022-11-22 NOTE — HOME HEALTH
Patient greeted therapist at the door in his wheelchair and his neighbor was visiting. Patient reported his back was hurting a little.     PLAN FOR NEXT VISIT   MEDICAL NECESSITY FOR ONGOING SKILLED THERAPY: Cognitive linguistic therapy to restore cognition to increase safety, communication and function in home. Skilled Speech Therapy interventions are necessary due to decline in memory skills and increased dependence on caregivers.   SPECIFIC INTERVENTIONS AND GOALS TO ADDRESS ON NEXT VISIT:   Memory strategies   Education on external memory aids   Cognitive tasks   FREQUENCY AND DURATION: 2w3   ANY OTHER FOLLOW UP NEEDED: None   REASSESSMENT DUE DATE: 11/26/22

## 2022-11-23 ENCOUNTER — HOME CARE VISIT (OUTPATIENT)
Dept: HOME HEALTH SERVICES | Facility: HOME HEALTHCARE | Age: 82
End: 2022-11-23

## 2022-11-23 VITALS
SYSTOLIC BLOOD PRESSURE: 118 MMHG | TEMPERATURE: 97.3 F | DIASTOLIC BLOOD PRESSURE: 62 MMHG | OXYGEN SATURATION: 99 % | HEART RATE: 74 BPM | RESPIRATION RATE: 18 BRPM

## 2022-11-23 PROCEDURE — G0153 HHCP-SVS OF S/L PATH,EA 15MN: HCPCS

## 2022-11-24 NOTE — HOME HEALTH
Patient greeted therapist at the door in his wheelchair and reported doing well today.   PLAN FOR NEXT VISIT   MEDICAL NECESSITY FOR ONGOING SKILLED THERAPY: Cognitive linguistic therapy to restore cognition to increase safety, communication and function in home. Skilled Speech Therapy interventions are necessary due to decline in memory skills and increased dependence on caregivers.   SPECIFIC INTERVENTIONS AND GOALS TO ADDRESS ON NEXT VISIT:   Memory strategies   Education on external memory aids   Cognitive tasks   FREQUENCY AND DURATION: 2w3   ANY OTHER FOLLOW UP NEEDED: None   REASSESSMENT DUE DATE: 11/26/22

## 2022-11-29 ENCOUNTER — HOME CARE VISIT (OUTPATIENT)
Dept: HOME HEALTH SERVICES | Facility: HOME HEALTHCARE | Age: 82
End: 2022-11-29

## 2022-11-29 VITALS
SYSTOLIC BLOOD PRESSURE: 118 MMHG | DIASTOLIC BLOOD PRESSURE: 60 MMHG | HEART RATE: 77 BPM | RESPIRATION RATE: 18 BRPM | OXYGEN SATURATION: 100 % | TEMPERATURE: 96.9 F

## 2022-11-29 PROCEDURE — G0153 HHCP-SVS OF S/L PATH,EA 15MN: HCPCS

## 2022-11-30 NOTE — HOME HEALTH
Patient greeted therapist at the door and reported doing well today. Patient reported seeing Dr Aguilar yesterday.     PLAN FOR NEXT VISIT   MEDICAL NECESSITY FOR ONGOING SKILLED THERAPY: Cognitive linguistic therapy to restore cognition to increase safety, communication and function in home. Skilled Speech Therapy interventions are necessary due to decline in memory skills and increased dependence on caregivers.   SPECIFIC INTERVENTIONS AND GOALS TO ADDRESS ON NEXT VISIT:   Memory strategies   Education on external memory aids   Cognitive tasks   FREQUENCY AND DURATION: 2w3   ANY OTHER FOLLOW UP NEEDED: None   REASSESSMENT DUE DATE: 11/26/22

## 2022-12-01 ENCOUNTER — HOME CARE VISIT (OUTPATIENT)
Dept: HOME HEALTH SERVICES | Facility: HOME HEALTHCARE | Age: 82
End: 2022-12-01

## 2022-12-01 VITALS
OXYGEN SATURATION: 96 % | DIASTOLIC BLOOD PRESSURE: 66 MMHG | HEART RATE: 66 BPM | TEMPERATURE: 96.9 F | SYSTOLIC BLOOD PRESSURE: 120 MMHG

## 2022-12-01 PROCEDURE — G0153 HHCP-SVS OF S/L PATH,EA 15MN: HCPCS

## 2022-12-01 NOTE — HOME HEALTH
HUD DC- HUDDLE DISCHARGE    Complete day 51-58    Plan for discharge: Discharge to family's care.    Barriers to discharge: None    Ongoing needs: None    Any referrals needed: None    Any declines in outcomes: discuss and document reason: No declines    Teaching needed prior to discharge: None    Date of last visit by each discipline:  ST- 12/1/22    SN-11/17/22   OT- 11/9/22   MSW- 11/2/22    Assign DC notice responsibility: Yes    Assign oasis discharge responsibility: Yes

## 2023-03-31 ENCOUNTER — HOSPITAL ENCOUNTER (EMERGENCY)
Facility: HOSPITAL | Age: 83
Discharge: HOME | End: 2023-04-01
Attending: EMERGENCY MEDICINE | Admitting: EMERGENCY MEDICINE
Payer: MEDICARE

## 2023-03-31 DIAGNOSIS — R04.0 EPISTAXIS: Primary | ICD-10-CM

## 2023-03-31 DIAGNOSIS — E11.9 TYPE 2 DIABETES MELLITUS WITHOUT COMPLICATION, UNSPECIFIED WHETHER LONG TERM INSULIN USE: ICD-10-CM

## 2023-03-31 DIAGNOSIS — D64.9 CHRONIC ANEMIA: ICD-10-CM

## 2023-03-31 DIAGNOSIS — B37.0 THRUSH: ICD-10-CM

## 2023-03-31 DIAGNOSIS — D69.6 THROMBOCYTOPENIA: ICD-10-CM

## 2023-03-31 DIAGNOSIS — N18.9 CHRONIC KIDNEY DISEASE, UNSPECIFIED CKD STAGE: ICD-10-CM

## 2023-03-31 PROCEDURE — 99283 EMERGENCY DEPT VISIT LOW MDM: CPT

## 2023-03-31 PROCEDURE — 36415 COLL VENOUS BLD VENIPUNCTURE: CPT

## 2023-03-31 RX ADMIN — PHENYLEPHRINE HYDROCHLORIDE 2 SPRAY: 0.5 SPRAY NASAL at 23:57

## 2023-04-01 VITALS
WEIGHT: 200 LBS | SYSTOLIC BLOOD PRESSURE: 154 MMHG | DIASTOLIC BLOOD PRESSURE: 69 MMHG | TEMPERATURE: 98.6 F | RESPIRATION RATE: 18 BRPM | BODY MASS INDEX: 24.36 KG/M2 | HEIGHT: 76 IN | OXYGEN SATURATION: 98 % | HEART RATE: 85 BPM

## 2023-04-01 LAB
ALBUMIN SERPL-MCNC: 4 G/DL (ref 3.5–5.2)
ALBUMIN/GLOB SERPL: 1.7 G/DL
ALP SERPL-CCNC: 99 U/L (ref 39–117)
ALT SERPL W P-5'-P-CCNC: 16 U/L (ref 1–41)
ANION GAP SERPL CALCULATED.3IONS-SCNC: 7.1 MMOL/L (ref 5–15)
APTT PPP: 26.2 SECONDS (ref 22.7–35.4)
AST SERPL-CCNC: 14 U/L (ref 1–40)
BASOPHILS # BLD AUTO: 0.05 10*3/MM3 (ref 0–0.2)
BASOPHILS NFR BLD AUTO: 0.9 % (ref 0–1.5)
BILIRUB SERPL-MCNC: 0.4 MG/DL (ref 0–1.2)
BUN SERPL-MCNC: 26 MG/DL (ref 8–23)
BUN/CREAT SERPL: 12.6 (ref 7–25)
CALCIUM SPEC-SCNC: 9.7 MG/DL (ref 8.6–10.5)
CHLORIDE SERPL-SCNC: 102 MMOL/L (ref 98–107)
CO2 SERPL-SCNC: 28.9 MMOL/L (ref 22–29)
CREAT SERPL-MCNC: 2.07 MG/DL (ref 0.76–1.27)
DEPRECATED RDW RBC AUTO: 48.9 FL (ref 37–54)
EGFRCR SERPLBLD CKD-EPI 2021: 31 ML/MIN/1.73
EOSINOPHIL # BLD AUTO: 0.13 10*3/MM3 (ref 0–0.4)
EOSINOPHIL NFR BLD AUTO: 2.5 % (ref 0.3–6.2)
ERYTHROCYTE [DISTWIDTH] IN BLOOD BY AUTOMATED COUNT: 14.5 % (ref 12.3–15.4)
GLOBULIN UR ELPH-MCNC: 2.4 GM/DL
GLUCOSE SERPL-MCNC: 327 MG/DL (ref 65–99)
HCT VFR BLD AUTO: 34.8 % (ref 37.5–51)
HGB BLD-MCNC: 11.7 G/DL (ref 13–17.7)
INR PPP: 1.09 (ref 0.9–1.1)
LYMPHOCYTES # BLD AUTO: 0.92 10*3/MM3 (ref 0.7–3.1)
LYMPHOCYTES NFR BLD AUTO: 17.4 % (ref 19.6–45.3)
MCH RBC QN AUTO: 30.8 PG (ref 26.6–33)
MCHC RBC AUTO-ENTMCNC: 33.6 G/DL (ref 31.5–35.7)
MCV RBC AUTO: 91.6 FL (ref 79–97)
MONOCYTES # BLD AUTO: 0.75 10*3/MM3 (ref 0.1–0.9)
MONOCYTES NFR BLD AUTO: 14.2 % (ref 5–12)
NEUTROPHILS NFR BLD AUTO: 3.42 10*3/MM3 (ref 1.7–7)
NEUTROPHILS NFR BLD AUTO: 64.4 % (ref 42.7–76)
PLATELET # BLD AUTO: 135 10*3/MM3 (ref 140–450)
PMV BLD AUTO: 11.1 FL (ref 6–12)
POTASSIUM SERPL-SCNC: 4 MMOL/L (ref 3.5–5.2)
PROT SERPL-MCNC: 6.4 G/DL (ref 6–8.5)
PROTHROMBIN TIME: 14.3 SECONDS (ref 11.7–14.2)
RBC # BLD AUTO: 3.8 10*6/MM3 (ref 4.14–5.8)
SODIUM SERPL-SCNC: 138 MMOL/L (ref 136–145)
WBC NRBC COR # BLD: 5.3 10*3/MM3 (ref 3.4–10.8)

## 2023-04-01 PROCEDURE — 85730 THROMBOPLASTIN TIME PARTIAL: CPT | Performed by: PHYSICIAN ASSISTANT

## 2023-04-01 PROCEDURE — 85025 COMPLETE CBC W/AUTO DIFF WBC: CPT | Performed by: PHYSICIAN ASSISTANT

## 2023-04-01 PROCEDURE — 85610 PROTHROMBIN TIME: CPT | Performed by: PHYSICIAN ASSISTANT

## 2023-04-01 PROCEDURE — 80053 COMPREHEN METABOLIC PANEL: CPT | Performed by: PHYSICIAN ASSISTANT

## 2023-04-01 NOTE — ED PROVIDER NOTES
MD ATTESTATION NOTE    The MIGEL and I have discussed this patient's history, physical exam, and treatment plan.    I provided a substantive portion of the care of this patient. I personally performed the physical exam, in its entirety. The attached note describes my personal findings.      Cheng Barros is a 82 y.o. male who presents to the ED c/o bleeding from left nostril.  States it started after vigorously blowing his nose this evening.  Started about an hour prior to arrival.  On arrival in the ED bleeding had largely resolved.  Patient is not on any blood thinners.  Patient denies any bloody taste or sensation of blood dripping down his throat.  No other complaints.      On exam:  GENERAL: not distressed  HENT: nares patent, dried blood to left nare  EYES: no scleral icterus  CV: regular rhythm, regular rate  RESPIRATORY: normal effort  ABDOMEN: soft  MUSCULOSKELETAL: no deformity  NEURO: alert, moves all extremities, follows commands  SKIN: warm, dry    Labs  Recent Results (from the past 24 hour(s))   Comprehensive Metabolic Panel    Collection Time: 04/01/23  1:26 AM    Specimen: Blood   Result Value Ref Range    Glucose 327 (H) 65 - 99 mg/dL    BUN 26 (H) 8 - 23 mg/dL    Creatinine 2.07 (H) 0.76 - 1.27 mg/dL    Sodium 138 136 - 145 mmol/L    Potassium 4.0 3.5 - 5.2 mmol/L    Chloride 102 98 - 107 mmol/L    CO2 28.9 22.0 - 29.0 mmol/L    Calcium 9.7 8.6 - 10.5 mg/dL    Total Protein 6.4 6.0 - 8.5 g/dL    Albumin 4.0 3.5 - 5.2 g/dL    ALT (SGPT) 16 1 - 41 U/L    AST (SGOT) 14 1 - 40 U/L    Alkaline Phosphatase 99 39 - 117 U/L    Total Bilirubin 0.4 0.0 - 1.2 mg/dL    Globulin 2.4 gm/dL    A/G Ratio 1.7 g/dL    BUN/Creatinine Ratio 12.6 7.0 - 25.0    Anion Gap 7.1 5.0 - 15.0 mmol/L    eGFR 31.0 (L) >60.0 mL/min/1.73   Protime-INR    Collection Time: 04/01/23  1:26 AM    Specimen: Blood   Result Value Ref Range    Protime 14.3 (H) 11.7 - 14.2 Seconds    INR 1.09 0.90 - 1.10   aPTT    Collection Time: 04/01/23  1:26  AM    Specimen: Blood   Result Value Ref Range    PTT 26.2 22.7 - 35.4 seconds   CBC Auto Differential    Collection Time: 04/01/23  1:26 AM    Specimen: Blood   Result Value Ref Range    WBC 5.30 3.40 - 10.80 10*3/mm3    RBC 3.80 (L) 4.14 - 5.80 10*6/mm3    Hemoglobin 11.7 (L) 13.0 - 17.7 g/dL    Hematocrit 34.8 (L) 37.5 - 51.0 %    MCV 91.6 79.0 - 97.0 fL    MCH 30.8 26.6 - 33.0 pg    MCHC 33.6 31.5 - 35.7 g/dL    RDW 14.5 12.3 - 15.4 %    RDW-SD 48.9 37.0 - 54.0 fl    MPV 11.1 6.0 - 12.0 fL    Platelets 135 (L) 140 - 450 10*3/mm3    Neutrophil % 64.4 42.7 - 76.0 %    Lymphocyte % 17.4 (L) 19.6 - 45.3 %    Monocyte % 14.2 (H) 5.0 - 12.0 %    Eosinophil % 2.5 0.3 - 6.2 %    Basophil % 0.9 0.0 - 1.5 %    Neutrophils, Absolute 3.42 1.70 - 7.00 10*3/mm3    Lymphocytes, Absolute 0.92 0.70 - 3.10 10*3/mm3    Monocytes, Absolute 0.75 0.10 - 0.90 10*3/mm3    Eosinophils, Absolute 0.13 0.00 - 0.40 10*3/mm3    Basophils, Absolute 0.05 0.00 - 0.20 10*3/mm3       Radiology  No Radiology Exams Resulted Within Past 24 Hours    Medications given in the ED:  Medications - No data to display    Orders placed during this visit:  Orders Placed This Encounter   Procedures   • Comprehensive Metabolic Panel   • Protime-INR   • aPTT   • CBC Auto Differential   • CBC & Differential       Medical Decision Making:  ED Course as of 04/01/23 0730   Sat Apr 01, 2023   0106 Patient rechecked, sitting in stretcher, no acute distress.  No further bleeding.  Awaiting lab results. [MYRIAM]   0156 WBC: 5.30 [MYRIAM]   0156 Hemoglobin(!): 11.7 [MYRIAM]   0156 Hematocrit(!): 34.8 [MYRIAM]   0156 Platelets(!): 135 [MYRIAM]   0156 Glucose(!): 327 [MYRIAM]   0156 BUN(!): 26 [MYRIAM]   0156 Creatinine(!): 2.07 [MYRIAM]   0156 Sodium: 138 [MYRIAM]   0156 Potassium: 4.0 [MYRIAM]   0156 Chloride: 102 [MYRIAM]   0156 CO2: 28.9 [MYRIAM]   0157 PTT: 26.2 [MYRIAM]   0157 Protime(!): 14.3 [MYRIAM]   0157 INR: 1.09 [MYRIAM]   0157 Hemoglobin and platelets are at baseline, kidney function is increased from prior of 1.53.  [MYRIAM]   0219 Patient rechecked, small amount of bleeding noted.  Removed clot from the left nares and applied 2 more sprays of Josue-Synephrine.  We will continue to monitor. [MYRIAM]   0307 Patient rechecked, no further bleeding.  Will discharge home. [MYRIAM]      ED Course User Index  [MYRIAM] River Mcmahan PA             PPE: Both the patient and I wore a surgical mask throughout the entire patient encounter.     Diagnosis  Final diagnoses:   Epistaxis   Chronic kidney disease, unspecified CKD stage   Chronic anemia   Thrombocytopenia   Type 2 diabetes mellitus without complication, unspecified whether long term insulin use (HCC)   Woody Hart MD  04/01/23 6835

## 2023-04-01 NOTE — ED NOTES
"Patient presents to the ED with complaint of  nosebleed  for the following  hour . Patient is alert, oriented x3  speech normal in context and clarity. Patient denies  trauma . Nursing Assessment completed at this time.    /75   Pulse 83   Temp 98.6 °F (37 °C) (Tympanic)   Resp 20   Ht 193 cm (76\")   Wt 90.7 kg (200 lb)   SpO2 95%   BMI 24.34 kg/m²   General appearance: alert, appears stated age, and cooperative  Nose: no discharge, moderate congestion, turbinates epistaxis noted, more left than right, none       Patient to ED tonight with c/o epistaxis. States he blew his nose at home and began having bleeding. Denies trauma. On no anticoags. No distress noted.    No distress noted. Will monitor for changes. VS below    I have reviewed the patient's current vital signs as documented in the patient's EMR.    "

## 2023-04-01 NOTE — DISCHARGE INSTRUCTIONS
Home, rest, avoid any blowing of your nose, use nasal spray every 4-6 hours as needed only, may also use small application of Vaseline to keep nose moist.  Follow-up with ENT for recheck and further evaluation.  Home medicine as prescribed, follow up with PCP for recheck. Return to care if symptoms worsen or with further concerns.

## 2023-04-01 NOTE — ED NOTES
Patient to ED via EMS from home c/o nosebleed beginning 45 min PTA. Patient is on blood thinners but is unsure which one. Nose clamp applied at this time.

## 2023-04-01 NOTE — ED PROVIDER NOTES
" EMERGENCY DEPARTMENT ENCOUNTER    Room Number:  01/01  Date of encounter:  4/1/2023  PCP: Lucas Hou MD  Patient Care Team:  Lucas Hou MD as PCP - General (Family Medicine)   Independent Historians: Patient    HPI:  Chief Complaint: Epistaxis  A complete HPI/ROS/PMH/PSH/SH/FH are unobtainable due to: N/A    Chronic or social conditions impacting patient care (social determinants of health): None    Context: Cheng Barros is a 82 y.o. male with past medical history of diabetes, lymphoma, CKD, HLD, and chronic anemia who arrives to the ED with complaint of nosebleed.  Patient states that he blew his nose and shortly after started \"pouring\" blood from his left nostril.  Patient denies any recent headache, fever or chills, sinus pain or drainage, sore throat or other complaints.  Patient arrives via EMS, nose is clamped, and there is no active bleeding.    Review of prior external notes (non-ED): Office visit notes state patient has a history of Hodgkin's lymphoma, only blood thinner found in the records is an 81 mg aspirin.    Review of prior external test results outside of this encounter: Patient's most recent hemoglobin was 11.4 on 10/12/2022, and platelets were low at 122.    PAST MEDICAL HISTORY  Active Ambulatory Problems     Diagnosis Date Noted   • Shortness of breath 10/11/2022   • Acute kidney injury (HCC) 10/11/2022   • Hypoglycemia 10/11/2022   • Thrombocytopenia (HCC) 10/11/2022   • Anemia, chronic disease 10/11/2022   • Elevated troponin 10/11/2022   • CKD (chronic kidney disease) stage 3, GFR 30-59 ml/min (HCC) 10/11/2022     Resolved Ambulatory Problems     Diagnosis Date Noted   • No Resolved Ambulatory Problems     Past Medical History:   Diagnosis Date   • Asthma    • CKD (chronic kidney disease)    • Diabetes (HCC)    • Lymphoma (HCC)        The patient has started, but not completed, their COVID-19 vaccination series.    PAST SURGICAL HISTORY  History reviewed. No pertinent " surgical history.      FAMILY HISTORY  History reviewed. No pertinent family history.      SOCIAL HISTORY  Social History     Socioeconomic History   • Marital status:    Tobacco Use   • Smoking status: Former     Packs/day: 1.00     Years: 40.00     Pack years: 40.00     Types: Cigarettes     Quit date: 10/1/1980     Years since quittin.5   • Tobacco comments:     Quit 40 years ago   Vaping Use   • Vaping Use: Never used   Substance and Sexual Activity   • Alcohol use: Not Currently     Comment: hasnt drank since 2019   • Drug use: Never         ALLERGIES  Patient has no known allergies.        REVIEW OF SYSTEMS  Review of Systems     All systems reviewed and negative except for those discussed in HPI.       PHYSICAL EXAM    I have reviewed the triage vital signs and nursing notes.    ED Triage Vitals [23 9027]   Temp Heart Rate Resp BP SpO2   98.6 °F (37 °C) 83 20 133/75 95 %      Temp src Heart Rate Source Patient Position BP Location FiO2 (%)   Tympanic -- -- -- --       Physical Exam    GENERAL: alert and oriented x4, not distressed  HENT: normocephalic, atraumatic, dry mucous membranes with white exudate present in the posterior pharynx consistent with thrush, there is dried blood in the left nares, no active bleeding, right nares is clear, small amount of blood in the posterior pharynx  EYES: no scleral icterus, PERRL, EOMI  CV: regular rhythm, regular rate, no murmurs, rubs, or gallops  RESPIRATORY: normal effort, CTAB  MUSCULOSKELETAL: no deformity  NEURO: alert, moves all extremities, no focal neuro deficits, follows commands  SKIN: warm, dry, no rash, no petechiae or ecchymosis  Psych: Appropriate mood and affect      Nursing notes and vital signs reviewed      LAB RESULTS  Recent Results (from the past 24 hour(s))   Comprehensive Metabolic Panel    Collection Time: 23  1:26 AM    Specimen: Blood   Result Value Ref Range    Glucose 327 (H) 65 - 99 mg/dL    BUN 26 (H) 8 - 23 mg/dL     Creatinine 2.07 (H) 0.76 - 1.27 mg/dL    Sodium 138 136 - 145 mmol/L    Potassium 4.0 3.5 - 5.2 mmol/L    Chloride 102 98 - 107 mmol/L    CO2 28.9 22.0 - 29.0 mmol/L    Calcium 9.7 8.6 - 10.5 mg/dL    Total Protein 6.4 6.0 - 8.5 g/dL    Albumin 4.0 3.5 - 5.2 g/dL    ALT (SGPT) 16 1 - 41 U/L    AST (SGOT) 14 1 - 40 U/L    Alkaline Phosphatase 99 39 - 117 U/L    Total Bilirubin 0.4 0.0 - 1.2 mg/dL    Globulin 2.4 gm/dL    A/G Ratio 1.7 g/dL    BUN/Creatinine Ratio 12.6 7.0 - 25.0    Anion Gap 7.1 5.0 - 15.0 mmol/L    eGFR 31.0 (L) >60.0 mL/min/1.73   Protime-INR    Collection Time: 04/01/23  1:26 AM    Specimen: Blood   Result Value Ref Range    Protime 14.3 (H) 11.7 - 14.2 Seconds    INR 1.09 0.90 - 1.10   aPTT    Collection Time: 04/01/23  1:26 AM    Specimen: Blood   Result Value Ref Range    PTT 26.2 22.7 - 35.4 seconds   CBC Auto Differential    Collection Time: 04/01/23  1:26 AM    Specimen: Blood   Result Value Ref Range    WBC 5.30 3.40 - 10.80 10*3/mm3    RBC 3.80 (L) 4.14 - 5.80 10*6/mm3    Hemoglobin 11.7 (L) 13.0 - 17.7 g/dL    Hematocrit 34.8 (L) 37.5 - 51.0 %    MCV 91.6 79.0 - 97.0 fL    MCH 30.8 26.6 - 33.0 pg    MCHC 33.6 31.5 - 35.7 g/dL    RDW 14.5 12.3 - 15.4 %    RDW-SD 48.9 37.0 - 54.0 fl    MPV 11.1 6.0 - 12.0 fL    Platelets 135 (L) 140 - 450 10*3/mm3    Neutrophil % 64.4 42.7 - 76.0 %    Lymphocyte % 17.4 (L) 19.6 - 45.3 %    Monocyte % 14.2 (H) 5.0 - 12.0 %    Eosinophil % 2.5 0.3 - 6.2 %    Basophil % 0.9 0.0 - 1.5 %    Neutrophils, Absolute 3.42 1.70 - 7.00 10*3/mm3    Lymphocytes, Absolute 0.92 0.70 - 3.10 10*3/mm3    Monocytes, Absolute 0.75 0.10 - 0.90 10*3/mm3    Eosinophils, Absolute 0.13 0.00 - 0.40 10*3/mm3    Basophils, Absolute 0.05 0.00 - 0.20 10*3/mm3       Ordered the above labs and independently reviewed and interpreted the results by me.        RADIOLOGY  No Radiology Exams Resulted Within Past 24 Hours    I ordered the above noted radiological studies.  These were  independently interpreted and reviewed by me.  See dictation for official radiology interpretation.      PROCEDURES    Procedures      MEDICATIONS GIVEN IN ER    Medications   phenylephrine (SUSI-SYNEPHRINE) 0.5 % nasal spray 2 spray (2 sprays Nasal Given 3/31/23 2357)         PROGRESS, DATA ANALYSIS, CONSULTS, AND MEDICAL DECISION MAKING    All labs have been independently reviewed by me.  All radiology studies have been reviewed by me and discussed with radiologist dictating the report.   EKG's independently viewed and interpreted by me.  Discussion below represents my analysis of pertinent findings related to patient's condition, differential diagnosis, treatment plan and final disposition.    DDx:  Includes, but is not limited to epistaxis, sinus infection, hematemesis, hemoptysis      ED Course as of 04/01/23 0340   Sat Apr 01, 2023   0106 Patient rechecked, sitting in stretcher, no acute distress.  No further bleeding.  Awaiting lab results. [MYRIAM]   0156 WBC: 5.30 [MYRIAM]   0156 Hemoglobin(!): 11.7 [MYRIAM]   0156 Hematocrit(!): 34.8 [MYRIAM]   0156 Platelets(!): 135 [MYRIAM]   0156 Glucose(!): 327 [MYRIAM]   0156 BUN(!): 26 [MYRIAM]   0156 Creatinine(!): 2.07 [MYRIAM]   0156 Sodium: 138 [MYRIAM]   0156 Potassium: 4.0 [MYRIAM]   0156 Chloride: 102 [MYRIAM]   0156 CO2: 28.9 [MYRIAM]   0157 PTT: 26.2 [MYRIAM]   0157 Protime(!): 14.3 [MYRIAM]   0157 INR: 1.09 [MYRIAM]   0157 Hemoglobin and platelets are at baseline, kidney function is increased from prior of 1.53. [MYRIAM]   0219 Patient rechecked, small amount of bleeding noted.  Removed clot from the left nares and applied 2 more sprays of Susi-Synephrine.  We will continue to monitor. [MYRIAM]   0307 Patient rechecked, no further bleeding.  Will discharge home. [MYRIAM]      ED Course User Index  [MYRIAM] River Mcmahan PA       MDM: Patient's blood counts are at baseline, there is no bleeding there is no visible source of the blood.    PPE:  The patient wore a mask and I wore a mask and all appropriate PPE throughout the entire  patient encounter.      AS OF 03:40 EDT VITALS:    BP - 145/66  HR - 77  TEMP - 98.6 °F (37 °C) (Tympanic)  O2 SATS - 96%      DIAGNOSIS  Final diagnoses:   Epistaxis   Chronic kidney disease, unspecified CKD stage   Chronic anemia   Thrombocytopenia (HCC)   Type 2 diabetes mellitus without complication, unspecified whether long term insulin use (HCC)   Thrush         DISPOSITION  DISCHARGE    Patient discharged in stable condition.    Reviewed implications of results, diagnosis, meds, responsibility to follow up, warning signs and symptoms of possible worsening, potential complications and reasons to return to ER.    Patient/Family voiced understanding of above instructions.    Discussed plan for discharge, as there is no emergent indication for admission. Patient referred to primary care provider for BP management due to today's BP. Pt/family is agreeable and understands need for follow up and repeat testing.  Pt is aware that discharge does not mean that nothing is wrong but it indicates no emergency is present that requires admission and they must continue care with follow-up as given below or physician of their choice.     FOLLOW-UP  Chintan Leonard MD  [id number] [id number] [phone]    Schedule an appointment as soon as possible for a visit       Lucas Hou MD  [id number] [id number] [phone]    Schedule an appointment as soon as possible for a visit            Medication List      No changes were made to your prescriptions during this visit.           Note Disclaimer: At [facility], we believe that sharing information builds trust and better relationships. You are receiving this note because you recently visited [facility]. It is possible you will see health information before a provider has talked with you about it. This kind of information can be easy to misunderstand. To help you fully understand what it means for your health,  we urge you to discuss this note with your provider.     River Mcmahan PA  04/01/23 0346